# Patient Record
Sex: MALE
[De-identification: names, ages, dates, MRNs, and addresses within clinical notes are randomized per-mention and may not be internally consistent; named-entity substitution may affect disease eponyms.]

---

## 2022-06-08 ENCOUNTER — NURSE TRIAGE (OUTPATIENT)
Dept: OTHER | Facility: CLINIC | Age: 67
End: 2022-06-08

## 2023-04-17 DIAGNOSIS — I10 HYPERTENSION, UNSPECIFIED TYPE: Primary | ICD-10-CM

## 2023-04-17 RX ORDER — METOPROLOL SUCCINATE 50 MG/1
50 TABLET, EXTENDED RELEASE ORAL DAILY
COMMUNITY
End: 2023-04-17 | Stop reason: SDUPTHER

## 2023-04-17 RX ORDER — METOPROLOL SUCCINATE 50 MG/1
50 TABLET, EXTENDED RELEASE ORAL DAILY
Qty: 90 TABLET | Refills: 0 | Status: SHIPPED | OUTPATIENT
Start: 2023-04-17 | End: 2023-07-17 | Stop reason: SDUPTHER

## 2023-05-01 DIAGNOSIS — E11.9 TYPE 2 DIABETES MELLITUS WITHOUT COMPLICATION, WITHOUT LONG-TERM CURRENT USE OF INSULIN (MULTI): Primary | ICD-10-CM

## 2023-05-01 RX ORDER — GLIPIZIDE 10 MG/1
10 TABLET ORAL
COMMUNITY
Start: 2017-04-13 | End: 2023-05-01 | Stop reason: SDUPTHER

## 2023-05-01 RX ORDER — GLIPIZIDE 10 MG/1
10 TABLET ORAL
Qty: 180 TABLET | Refills: 0 | Status: SHIPPED | OUTPATIENT
Start: 2023-05-01 | End: 2023-08-08

## 2023-07-17 DIAGNOSIS — E78.5 HYPERLIPIDEMIA, UNSPECIFIED HYPERLIPIDEMIA TYPE: Primary | ICD-10-CM

## 2023-07-17 DIAGNOSIS — I10 HYPERTENSION, UNSPECIFIED TYPE: ICD-10-CM

## 2023-07-17 RX ORDER — METOPROLOL SUCCINATE 50 MG/1
50 TABLET, EXTENDED RELEASE ORAL DAILY
Qty: 30 TABLET | Refills: 0 | Status: SHIPPED | OUTPATIENT
Start: 2023-07-17 | End: 2023-08-17 | Stop reason: SDUPTHER

## 2023-07-17 RX ORDER — ATORVASTATIN CALCIUM 40 MG/1
40 TABLET, FILM COATED ORAL DAILY
COMMUNITY
Start: 2023-04-17 | End: 2023-07-17 | Stop reason: SDUPTHER

## 2023-07-17 RX ORDER — ATORVASTATIN CALCIUM 40 MG/1
40 TABLET, FILM COATED ORAL DAILY
Qty: 30 TABLET | Refills: 0 | Status: SHIPPED | OUTPATIENT
Start: 2023-07-17 | End: 2023-08-17 | Stop reason: SDUPTHER

## 2023-08-04 DIAGNOSIS — E11.9 TYPE 2 DIABETES MELLITUS WITHOUT COMPLICATION, WITHOUT LONG-TERM CURRENT USE OF INSULIN (MULTI): ICD-10-CM

## 2023-08-08 RX ORDER — GLIPIZIDE 10 MG/1
10 TABLET ORAL
Qty: 60 TABLET | Refills: 0 | Status: SHIPPED | OUTPATIENT
Start: 2023-08-08 | End: 2023-08-17 | Stop reason: SDUPTHER

## 2023-08-16 PROBLEM — I71.40 AAA (ABDOMINAL AORTIC ANEURYSM) (CMS-HCC): Status: ACTIVE | Noted: 2023-08-16

## 2023-08-16 PROBLEM — E16.2 HYPOGLYCEMIA: Status: ACTIVE | Noted: 2023-08-16

## 2023-08-16 PROBLEM — H52.03 HYPEROPIA OF BOTH EYES: Status: ACTIVE | Noted: 2023-08-16

## 2023-08-16 PROBLEM — J44.9 CHRONIC OBSTRUCTIVE PULMONARY DISEASE (MULTI): Status: ACTIVE | Noted: 2023-08-16

## 2023-08-16 PROBLEM — R09.89 BRUIT OF LEFT CAROTID ARTERY: Status: ACTIVE | Noted: 2023-08-16

## 2023-08-16 PROBLEM — I77.1: Status: ACTIVE | Noted: 2023-08-16

## 2023-08-16 PROBLEM — E11.9 DIABETES MELLITUS TYPE 2, CONTROLLED, WITHOUT COMPLICATIONS (MULTI): Status: ACTIVE | Noted: 2023-08-16

## 2023-08-16 PROBLEM — I10 BENIGN ESSENTIAL HYPERTENSION: Status: ACTIVE | Noted: 2023-08-16

## 2023-08-16 PROBLEM — F17.201 TOBACCO USE DISORDER, MODERATE, IN EARLY REMISSION: Status: ACTIVE | Noted: 2023-08-16

## 2023-08-16 PROBLEM — K21.9 GERD (GASTROESOPHAGEAL REFLUX DISEASE): Status: ACTIVE | Noted: 2023-08-16

## 2023-08-16 PROBLEM — R05.3 CHRONIC COUGH: Status: ACTIVE | Noted: 2023-08-16

## 2023-08-16 PROBLEM — F51.01 PRIMARY INSOMNIA: Status: ACTIVE | Noted: 2023-08-16

## 2023-08-16 PROBLEM — N52.9 ERECTILE DYSFUNCTION: Status: ACTIVE | Noted: 2023-08-16

## 2023-08-16 PROBLEM — I65.21 STENOSIS OF RIGHT CAROTID ARTERY: Status: ACTIVE | Noted: 2023-08-16

## 2023-08-16 PROBLEM — I73.9 PERIPHERAL VASCULAR DISEASE (CMS-HCC): Status: ACTIVE | Noted: 2023-08-16

## 2023-08-16 PROBLEM — K52.9 CHRONIC DIARRHEA: Status: ACTIVE | Noted: 2023-08-16

## 2023-08-16 PROBLEM — S16.1XXA NECK STRAIN, INITIAL ENCOUNTER: Status: RESOLVED | Noted: 2023-08-16 | Resolved: 2023-08-16

## 2023-08-16 PROBLEM — K63.8219 SMALL INTESTINAL BACTERIAL OVERGROWTH: Status: RESOLVED | Noted: 2023-08-16 | Resolved: 2023-08-16

## 2023-08-16 PROBLEM — G89.29 CHRONIC RIGHT SHOULDER PAIN: Status: ACTIVE | Noted: 2023-08-16

## 2023-08-16 PROBLEM — D72.829 LEUKOCYTOSIS: Status: RESOLVED | Noted: 2023-08-16 | Resolved: 2023-08-16

## 2023-08-16 PROBLEM — R74.8 ELEVATED LIVER ENZYMES: Status: ACTIVE | Noted: 2023-08-16

## 2023-08-16 PROBLEM — K86.89 SECONDARY PANCREATIC INSUFFICIENCY (HHS-HCC): Status: ACTIVE | Noted: 2023-08-16

## 2023-08-16 PROBLEM — R74.01 TRANSAMINITIS: Status: RESOLVED | Noted: 2023-08-16 | Resolved: 2023-08-16

## 2023-08-16 PROBLEM — N18.30 CKD (CHRONIC KIDNEY DISEASE), STAGE III (MULTI): Status: ACTIVE | Noted: 2023-08-16

## 2023-08-16 PROBLEM — H35.3121 EARLY DRY STAGE NONEXUDATIVE AGE-RELATED MACULAR DEGENERATION OF LEFT EYE: Status: ACTIVE | Noted: 2023-08-16

## 2023-08-16 PROBLEM — M25.511 CHRONIC RIGHT SHOULDER PAIN: Status: ACTIVE | Noted: 2023-08-16

## 2023-08-16 PROBLEM — H25.13 CATARACT, NUCLEAR SCLEROTIC, BOTH EYES: Status: ACTIVE | Noted: 2023-08-16

## 2023-08-16 PROBLEM — E13.9: Status: RESOLVED | Noted: 2023-08-16 | Resolved: 2023-08-16

## 2023-08-16 PROBLEM — E78.2 HYPERLIPIDEMIA, MIXED: Status: ACTIVE | Noted: 2023-08-16

## 2023-08-16 RX ORDER — HYDRALAZINE HYDROCHLORIDE 10 MG/1
10 TABLET, FILM COATED ORAL EVERY 12 HOURS
COMMUNITY
Start: 2023-05-09 | End: 2023-08-17 | Stop reason: SDUPTHER

## 2023-08-16 RX ORDER — PANCRELIPASE 24000; 76000; 120000 [USP'U]/1; [USP'U]/1; [USP'U]/1
CAPSULE, DELAYED RELEASE PELLETS ORAL
COMMUNITY
Start: 2019-10-01

## 2023-08-16 RX ORDER — PEN NEEDLE, DIABETIC 31 GX5/16"
NEEDLE, DISPOSABLE MISCELLANEOUS
COMMUNITY
Start: 2023-05-09 | End: 2023-08-17 | Stop reason: SDUPTHER

## 2023-08-16 RX ORDER — AMLODIPINE BESYLATE 10 MG/1
10 TABLET ORAL DAILY
COMMUNITY
End: 2023-08-17 | Stop reason: SDUPTHER

## 2023-08-16 RX ORDER — BLOOD SUGAR DIAGNOSTIC
STRIP MISCELLANEOUS
COMMUNITY
End: 2023-08-17 | Stop reason: SDUPTHER

## 2023-08-16 RX ORDER — INSULIN GLARGINE 100 [IU]/ML
20 INJECTION, SOLUTION SUBCUTANEOUS NIGHTLY
COMMUNITY
End: 2023-08-17 | Stop reason: SDUPTHER

## 2023-08-17 ENCOUNTER — LAB (OUTPATIENT)
Dept: LAB | Facility: LAB | Age: 68
End: 2023-08-17
Payer: MEDICARE

## 2023-08-17 ENCOUNTER — OFFICE VISIT (OUTPATIENT)
Dept: PRIMARY CARE | Facility: CLINIC | Age: 68
End: 2023-08-17
Payer: MEDICARE

## 2023-08-17 VITALS
OXYGEN SATURATION: 98 % | SYSTOLIC BLOOD PRESSURE: 136 MMHG | HEART RATE: 65 BPM | HEIGHT: 69 IN | WEIGHT: 162 LBS | DIASTOLIC BLOOD PRESSURE: 78 MMHG | BODY MASS INDEX: 23.99 KG/M2

## 2023-08-17 DIAGNOSIS — Z00.00 HEALTH CARE MAINTENANCE: Primary | ICD-10-CM

## 2023-08-17 DIAGNOSIS — I10 HYPERTENSION, UNSPECIFIED TYPE: ICD-10-CM

## 2023-08-17 DIAGNOSIS — Z91.89 AT RISK FOR IMPAIRED HEALTH MAINTENANCE: ICD-10-CM

## 2023-08-17 DIAGNOSIS — Z00.00 HEALTH CARE MAINTENANCE: ICD-10-CM

## 2023-08-17 DIAGNOSIS — I73.9 PERIPHERAL VASCULAR DISEASE (CMS-HCC): ICD-10-CM

## 2023-08-17 DIAGNOSIS — E11.9 TYPE 2 DIABETES MELLITUS WITHOUT COMPLICATION, WITHOUT LONG-TERM CURRENT USE OF INSULIN (MULTI): ICD-10-CM

## 2023-08-17 DIAGNOSIS — E78.5 HYPERLIPIDEMIA, UNSPECIFIED HYPERLIPIDEMIA TYPE: ICD-10-CM

## 2023-08-17 LAB
ALANINE AMINOTRANSFERASE (SGPT) (U/L) IN SER/PLAS: 52 U/L (ref 10–52)
ALBUMIN (G/DL) IN SER/PLAS: 3.5 G/DL (ref 3.4–5)
ALKALINE PHOSPHATASE (U/L) IN SER/PLAS: 122 U/L (ref 33–136)
ANION GAP IN SER/PLAS: 15 MMOL/L (ref 10–20)
ASPARTATE AMINOTRANSFERASE (SGOT) (U/L) IN SER/PLAS: 40 U/L (ref 9–39)
BASOPHILS (10*3/UL) IN BLOOD BY AUTOMATED COUNT: 0.09 X10E9/L (ref 0–0.1)
BASOPHILS/100 LEUKOCYTES IN BLOOD BY AUTOMATED COUNT: 0.8 % (ref 0–2)
BILIRUBIN TOTAL (MG/DL) IN SER/PLAS: 0.3 MG/DL (ref 0–1.2)
CALCIUM (MG/DL) IN SER/PLAS: 8.4 MG/DL (ref 8.6–10.6)
CARBON DIOXIDE, TOTAL (MMOL/L) IN SER/PLAS: 18 MMOL/L (ref 21–32)
CHLORIDE (MMOL/L) IN SER/PLAS: 113 MMOL/L (ref 98–107)
CHOLESTEROL (MG/DL) IN SER/PLAS: 106 MG/DL (ref 0–199)
CHOLESTEROL IN HDL (MG/DL) IN SER/PLAS: 29.6 MG/DL
CHOLESTEROL/HDL RATIO: 3.6
CREATININE (MG/DL) IN SER/PLAS: 2.33 MG/DL (ref 0.5–1.3)
EOSINOPHILS (10*3/UL) IN BLOOD BY AUTOMATED COUNT: 0.21 X10E9/L (ref 0–0.7)
EOSINOPHILS/100 LEUKOCYTES IN BLOOD BY AUTOMATED COUNT: 1.9 % (ref 0–6)
ERYTHROCYTE DISTRIBUTION WIDTH (RATIO) BY AUTOMATED COUNT: 13.7 % (ref 11.5–14.5)
ERYTHROCYTE MEAN CORPUSCULAR HEMOGLOBIN CONCENTRATION (G/DL) BY AUTOMATED: 31.3 G/DL (ref 32–36)
ERYTHROCYTE MEAN CORPUSCULAR VOLUME (FL) BY AUTOMATED COUNT: 88 FL (ref 80–100)
ERYTHROCYTES (10*6/UL) IN BLOOD BY AUTOMATED COUNT: 4.6 X10E12/L (ref 4.5–5.9)
ESTIMATED AVERAGE GLUCOSE FOR HBA1C: 200 MG/DL
GFR MALE: 30 ML/MIN/1.73M2
GLUCOSE (MG/DL) IN SER/PLAS: 48 MG/DL (ref 74–99)
HEMATOCRIT (%) IN BLOOD BY AUTOMATED COUNT: 40.6 % (ref 41–52)
HEMOGLOBIN (G/DL) IN BLOOD: 12.7 G/DL (ref 13.5–17.5)
HEMOGLOBIN A1C/HEMOGLOBIN TOTAL IN BLOOD: 8.6 %
IMMATURE GRANULOCYTES/100 LEUKOCYTES IN BLOOD BY AUTOMATED COUNT: 0.3 % (ref 0–0.9)
LDL: 62 MG/DL (ref 0–99)
LEUKOCYTES (10*3/UL) IN BLOOD BY AUTOMATED COUNT: 11.2 X10E9/L (ref 4.4–11.3)
LYMPHOCYTES (10*3/UL) IN BLOOD BY AUTOMATED COUNT: 2.81 X10E9/L (ref 1.2–4.8)
LYMPHOCYTES/100 LEUKOCYTES IN BLOOD BY AUTOMATED COUNT: 25.2 % (ref 13–44)
MONOCYTES (10*3/UL) IN BLOOD BY AUTOMATED COUNT: 0.95 X10E9/L (ref 0.1–1)
MONOCYTES/100 LEUKOCYTES IN BLOOD BY AUTOMATED COUNT: 8.5 % (ref 2–10)
NEUTROPHILS (10*3/UL) IN BLOOD BY AUTOMATED COUNT: 7.08 X10E9/L (ref 1.2–7.7)
NEUTROPHILS/100 LEUKOCYTES IN BLOOD BY AUTOMATED COUNT: 63.3 % (ref 40–80)
NRBC (PER 100 WBCS) BY AUTOMATED COUNT: 0 /100 WBC (ref 0–0)
PLATELETS (10*3/UL) IN BLOOD AUTOMATED COUNT: 251 X10E9/L (ref 150–450)
POTASSIUM (MMOL/L) IN SER/PLAS: 4.8 MMOL/L (ref 3.5–5.3)
PROSTATE SPECIFIC ANTIGEN,SCREEN: 0.47 NG/ML (ref 0–4)
PROTEIN TOTAL: 6.3 G/DL (ref 6.4–8.2)
SODIUM (MMOL/L) IN SER/PLAS: 141 MMOL/L (ref 136–145)
THYROTROPIN (MIU/L) IN SER/PLAS BY DETECTION LIMIT <= 0.05 MIU/L: 1.13 MIU/L (ref 0.44–3.98)
THYROXINE (T4) FREE (NG/DL) IN SER/PLAS: 0.95 NG/DL (ref 0.78–1.48)
TRIGLYCERIDE (MG/DL) IN SER/PLAS: 71 MG/DL (ref 0–149)
UREA NITROGEN (MG/DL) IN SER/PLAS: 32 MG/DL (ref 6–23)
VLDL: 14 MG/DL (ref 0–40)

## 2023-08-17 PROCEDURE — 3078F DIAST BP <80 MM HG: CPT | Performed by: INTERNAL MEDICINE

## 2023-08-17 PROCEDURE — 3046F HEMOGLOBIN A1C LEVEL >9.0%: CPT | Performed by: INTERNAL MEDICINE

## 2023-08-17 PROCEDURE — 85025 COMPLETE CBC W/AUTO DIFF WBC: CPT

## 2023-08-17 PROCEDURE — 1126F AMNT PAIN NOTED NONE PRSNT: CPT | Performed by: INTERNAL MEDICINE

## 2023-08-17 PROCEDURE — 83036 HEMOGLOBIN GLYCOSYLATED A1C: CPT

## 2023-08-17 PROCEDURE — 84153 ASSAY OF PSA TOTAL: CPT

## 2023-08-17 PROCEDURE — 80061 LIPID PANEL: CPT

## 2023-08-17 PROCEDURE — 99214 OFFICE O/P EST MOD 30 MIN: CPT | Performed by: INTERNAL MEDICINE

## 2023-08-17 PROCEDURE — 36415 COLL VENOUS BLD VENIPUNCTURE: CPT

## 2023-08-17 PROCEDURE — 1159F MED LIST DOCD IN RCRD: CPT | Performed by: INTERNAL MEDICINE

## 2023-08-17 PROCEDURE — 84439 ASSAY OF FREE THYROXINE: CPT

## 2023-08-17 PROCEDURE — 84443 ASSAY THYROID STIM HORMONE: CPT

## 2023-08-17 PROCEDURE — 3075F SYST BP GE 130 - 139MM HG: CPT | Performed by: INTERNAL MEDICINE

## 2023-08-17 PROCEDURE — 80053 COMPREHEN METABOLIC PANEL: CPT

## 2023-08-17 RX ORDER — CLOPIDOGREL BISULFATE 75 MG/1
75 TABLET ORAL DAILY
Qty: 90 TABLET | Refills: 1 | Status: SHIPPED | OUTPATIENT
Start: 2023-08-17

## 2023-08-17 RX ORDER — PEN NEEDLE, DIABETIC 30 GX3/16"
NEEDLE, DISPOSABLE MISCELLANEOUS
Qty: 100 EACH | Refills: 1 | Status: SHIPPED | OUTPATIENT
Start: 2023-08-17

## 2023-08-17 RX ORDER — AMLODIPINE BESYLATE 10 MG/1
10 TABLET ORAL DAILY
Qty: 90 TABLET | Refills: 1 | Status: SHIPPED | OUTPATIENT
Start: 2023-08-17 | End: 2024-04-15 | Stop reason: SDUPTHER

## 2023-08-17 RX ORDER — HYDRALAZINE HYDROCHLORIDE 10 MG/1
10 TABLET, FILM COATED ORAL EVERY 12 HOURS
Qty: 180 TABLET | Refills: 1 | Status: SHIPPED | OUTPATIENT
Start: 2023-08-17 | End: 2024-03-04

## 2023-08-17 RX ORDER — METOPROLOL SUCCINATE 50 MG/1
50 TABLET, EXTENDED RELEASE ORAL DAILY
Qty: 90 TABLET | Refills: 1 | Status: SHIPPED | OUTPATIENT
Start: 2023-08-17 | End: 2024-02-21

## 2023-08-17 RX ORDER — TALC
POWDER (GRAM) TOPICAL
COMMUNITY
Start: 2020-02-12 | End: 2024-02-21 | Stop reason: WASHOUT

## 2023-08-17 RX ORDER — BLOOD SUGAR DIAGNOSTIC
STRIP MISCELLANEOUS
Qty: 100 STRIP | Refills: 1 | Status: SHIPPED | OUTPATIENT
Start: 2023-08-17

## 2023-08-17 RX ORDER — ATORVASTATIN CALCIUM 40 MG/1
40 TABLET, FILM COATED ORAL DAILY
Qty: 90 TABLET | Refills: 1 | Status: SHIPPED | OUTPATIENT
Start: 2023-08-17 | End: 2024-02-16 | Stop reason: SDUPTHER

## 2023-08-17 RX ORDER — GLIPIZIDE 10 MG/1
10 TABLET ORAL
Qty: 180 TABLET | Refills: 1 | Status: SHIPPED | OUTPATIENT
Start: 2023-08-17 | End: 2023-08-17 | Stop reason: ALTCHOICE

## 2023-08-17 RX ORDER — CLOPIDOGREL BISULFATE 75 MG/1
75 TABLET ORAL DAILY
COMMUNITY
End: 2023-08-17 | Stop reason: SDUPTHER

## 2023-08-17 RX ORDER — INSULIN GLARGINE 100 [IU]/ML
20 INJECTION, SOLUTION SUBCUTANEOUS NIGHTLY
Qty: 15 ML | Refills: 1 | Status: SHIPPED | OUTPATIENT
Start: 2023-08-17 | End: 2024-02-16 | Stop reason: SDUPTHER

## 2023-08-17 ASSESSMENT — ENCOUNTER SYMPTOMS
LOSS OF SENSATION IN FEET: 0
DEPRESSION: 0
OCCASIONAL FEELINGS OF UNSTEADINESS: 0

## 2023-08-17 NOTE — PROGRESS NOTES
"Subjective   Patient ID: Flip Murphy is a 67 y.o. male who presents for Follow-up and Med Refill.  HPI        Patient presents for follow-up he is overall doing well aside from low blood sugars 50s to 60s sometimes he gets little shaky when this happens overall trend is around 1/21/1930 he has not followed up with endocrinology as ordered yet        Health Maintenance:      Colonoscopy:      Mammogram:      Pelvic/Pap:      Low dose chest CT:      Aorta duplex:      Optho:      Podiatry:        Vaccines:      Prevnar 20:      Prevnar 13:      Pneumovax 23:      Tdap:      Shingrix:      COVID:      Influenza:        ROS:      General: denies fever/chills/weight loss      Head: denies HA/trauma/masses/dizziness      Eyes: denies vision change/loss of vision/blurry vision/diplopia/eye pain      Ears: denies hearing loss/tinnitus/otalgia/otorrhea      Nose: denies nasal drainage/anosmia      Throat: denies dysphagia/odynophagia      Lymphatics: denies lymph node swelling      Cardiac: denies CP/palpitations/orthopnea/PND      Pulmonary: denies dyspnea/cough/wheezing      GI: denies abd pain/n/v/diarrhea/melena/hematochezia/hematemesis      : denies dysuria/hematuria/change frequency      Genital: denies genital discharge/lesions      Skin: denies rashes/lesions/masses      MSK: denies weakness/swelling/edema/gait imbalance/pain      Neuro: Intermittent tremor when his sugar is low none at present denies paresthesias/seizures/dysarthria      Psych: denies depression/anxiety/suicidal or homicidal ideations            Objective   /78   Pulse 65   Ht 1.753 m (5' 9\")   Wt 73.5 kg (162 lb)   SpO2 98%   BMI 23.92 kg/m²      Physical Exam:     General: AO3, NAD     Head: atraumatic/NC     Eyes: EOMI/PERRLA. Negative APD     Ears: TM pearly gray, EAC clear. No lesions or erythema     Nose: symmetric nares, no discharge     Throat: trachea midline, uvula midline pink mucosa. No thyromegaly     Lymphatics: no " "cervical/supraclavicular/ant or posterior cervical adenopathy/axillary/inguinal adenopathy     Breast: not examined     Chest: no deformity or tenderness to palpation     Pulm: CTA b/l, no wheeze/rhonchi/rales. nonlabored     Cardiac: RRR +s1s2, no m/r/g.      GI: soft, NT/ND. Normoactive Bsx4. No rebound/guarding.     Rectal: no examined     MSK: 5/5 strength UE LE. No edema/clubbing/cyanosis     Skin: no rashes/lesions     Vascular: 2+ palp DP PT radials b/l. Negative carotid bruit     Neuro: CNII-XII intact. No focal deficits. Reflexes 2/4 brachioradialis bicep tricep patellar achilles. Finger to nose intact.     Psych: appropriate mood/affect                    No results found for: \"BMPR1A\", \"CBCDIF\"      Assessment/Plan   Diagnoses and all orders for this visit:  Health care maintenance  -     CBC and Auto Differential; Future  -     Comprehensive Metabolic Panel; Future  -     Hemoglobin A1C; Future  -     Lipid panel; Future  -     T4, free; Future  -     TSH; Future  -     Prostate Spec.Ag,Screen; Future  Hyperlipidemia, unspecified hyperlipidemia type  -     atorvastatin (Lipitor) 40 mg tablet; Take 1 tablet (40 mg) by mouth once daily.  Hypertension, unspecified type  -     metoprolol succinate XL (Toprol-XL) 50 mg 24 hr tablet; Take 1 tablet (50 mg) by mouth once daily.  -     hydrALAZINE (Apresoline) 10 mg tablet; Take 1 tablet (10 mg) by mouth every 12 hours.  -     amLODIPine (Norvasc) 10 mg tablet; Take 1 tablet (10 mg) by mouth once daily.  Type 2 diabetes mellitus without complication, without long-term current use of insulin (CMS/[facility])  -     insulin glargine (Lantus Solostar U-100 Insulin) 100 unit/mL (3 mL) pen; Inject 20 Units under the skin once daily at bedtime.  -     pen needle, diabetic (BD Ultra-Fine Mini Pen Needle) 31 gauge x 3/16\" needle; USE DAILY FOR INSULIN INJECTIONS  -     blood sugar diagnostic (Contour Next Test Strips) strip; test once daily  -     SITagliptin phosphate " (Januvia) 50 mg tablet; Take 1 tablet (50 mg) by mouth once daily.  Peripheral vascular disease (CMS/HCC)  -     clopidogrel (Plavix) 75 mg tablet; Take 1 tablet (75 mg) by mouth once daily.  At risk for impaired health maintenance  -     CBC and Auto Differential; Future    Call follow-up with endocrinology as ordered    Call and follow-up with cardiology as ordered    Call and complete colonoscopy as he stated next month he had a planned    Thank you for making appointment today Flip    Please follow-up in 6 months       Esperanza Posey MA

## 2023-08-25 ENCOUNTER — TELEPHONE (OUTPATIENT)
Dept: PRIMARY CARE | Facility: CLINIC | Age: 68
End: 2023-08-25
Payer: MEDICARE

## 2023-08-25 DIAGNOSIS — D64.9 ANEMIA, UNSPECIFIED TYPE: ICD-10-CM

## 2023-08-25 DIAGNOSIS — R74.01 ELEVATED AST (SGOT): ICD-10-CM

## 2023-08-25 DIAGNOSIS — N18.30 TYPE 2 DIABETES MELLITUS WITH STAGE 3 CHRONIC KIDNEY DISEASE, UNSPECIFIED WHETHER LONG TERM INSULIN USE, UNSPECIFIED WHETHER STAGE 3A OR 3B CKD (MULTI): Primary | ICD-10-CM

## 2023-08-25 DIAGNOSIS — E11.22 TYPE 2 DIABETES MELLITUS WITH STAGE 3 CHRONIC KIDNEY DISEASE, UNSPECIFIED WHETHER LONG TERM INSULIN USE, UNSPECIFIED WHETHER STAGE 3A OR 3B CKD (MULTI): Primary | ICD-10-CM

## 2023-08-25 NOTE — TELEPHONE ENCOUNTER
Result Communication    Resulted Orders   CBC and Auto Differential   Result Value Ref Range    WBC 11.2 4.4 - 11.3 x10E9/L    nRBC 0.0 0.0 - 0.0 /100 WBC    RBC 4.60 4.50 - 5.90 x10E12/L    Hemoglobin 12.7 (L) 13.5 - 17.5 g/dL    Hematocrit 40.6 (L) 41.0 - 52.0 %    MCV 88 80 - 100 fL    MCHC 31.3 (L) 32.0 - 36.0 g/dL    Platelets 251 150 - 450 x10E9/L    RDW 13.7 11.5 - 14.5 %    Neutrophils % 63.3 40.0 - 80.0 %    Immature Granulocytes %, Automated 0.3 0.0 - 0.9 %      Comment:       Immature Granulocyte Count (IG) includes promyelocytes,    myelocytes and metamyelocytes but does not include bands.   Percent differential counts (%) should be interpreted in the   context of the absolute cell counts (cells/L).    Lymphocytes % 25.2 13.0 - 44.0 %    Monocytes % 8.5 2.0 - 10.0 %    Eosinophils % 1.9 0.0 - 6.0 %    Basophils % 0.8 0.0 - 2.0 %    Neutrophils Absolute 7.08 1.20 - 7.70 x10E9/L    Lymphocytes Absolute 2.81 1.20 - 4.80 x10E9/L    Monocytes Absolute 0.95 0.10 - 1.00 x10E9/L    Eosinophils Absolute 0.21 0.00 - 0.70 x10E9/L    Basophils Absolute 0.09 0.00 - 0.10 x10E9/L   Comprehensive Metabolic Panel   Result Value Ref Range    Glucose 48 (L) 74 - 99 mg/dL    Sodium 141 136 - 145 mmol/L    Potassium 4.8 3.5 - 5.3 mmol/L    Chloride 113 (H) 98 - 107 mmol/L    Bicarbonate 18 (L) 21 - 32 mmol/L    Anion Gap 15 10 - 20 mmol/L    Urea Nitrogen 32 (H) 6 - 23 mg/dL    Creatinine 2.33 (H) 0.50 - 1.30 mg/dL    GFR MALE 30 (A) >90 mL/min/1.73m2      Comment:       CALCULATIONS OF ESTIMATED GFR ARE PERFORMED   USING THE 2021 CKD-EPI STUDY REFIT EQUATION   WITHOUT THE RACE VARIABLE FOR THE IDMS-TRACEABLE   CREATININE METHODS.    https://jasn.asnjournals.org/content/early/2021/09/22/ASN.2368859437    Calcium 8.4 (L) 8.6 - 10.6 mg/dL    Albumin 3.5 3.4 - 5.0 g/dL    Alkaline Phosphatase 122 33 - 136 U/L    Total Protein 6.3 (L) 6.4 - 8.2 g/dL    AST 40 (H) 9 - 39 U/L    Total Bilirubin 0.3 0.0 - 1.2 mg/dL    ALT (SGPT) 52  10 - 52 U/L      Comment:       Patients treated with Sulfasalazine may generate    falsely decreased results for ALT.   Hemoglobin A1C   Result Value Ref Range    Hemoglobin A1C 8.6 (A) %      Comment:           Diagnosis of Diabetes-Adults   Non-Diabetic: < or = 5.6%   Increased risk for developing diabetes: 5.7-6.4%   Diagnostic of diabetes: > or = 6.5%  .       Monitoring of Diabetes                Age (y)     Therapeutic Goal (%)   Adults:          >18           <7.0   Pediatrics:    13-18           <7.5                   7-12           <8.0                   0- 6            7.5-8.5   American Diabetes Association. Diabetes Care 33(S1), Jan 2010.    Estimated Average Glucose 200 MG/DL   Lipid panel   Result Value Ref Range    Cholesterol 106 0 - 199 mg/dL      Comment:      .      AGE      DESIRABLE   BORDERLINE HIGH   HIGH     0-19 Y     0 - 169       170 - 199     >/= 200    20-24 Y     0 - 189       190 - 224     >/= 225         >24 Y     0 - 199       200 - 239     >/= 240   **All ranges are based on fasting samples. Specific   therapeutic targets will vary based on patient-specific   cardiac risk.  .   Pediatric guidelines reference:Pediatrics 2011, 128(S5).   Adult guidelines reference: NCEP ATPIII Guidelines,     JANIS 2001, 258:2486-97  .   Venipuncture immediately after or during the    administration of Metamizole may lead to falsely   low results. Testing should be performed immediately   prior to Metamizole dosing.    HDL 29.6 (A) mg/dL      Comment:      .      AGE      VERY LOW   LOW     NORMAL    HIGH       0-19 Y       < 35   < 40     40-45     ----    20-24 Y       ----   < 40       >45     ----      >24 Y       ----   < 40     40-60      >60  .    Cholesterol/HDL Ratio 3.6       Comment:      REF VALUES  DESIRABLE  < 3.4  HIGH RISK  > 5.0    LDL 62 0 - 99 mg/dL      Comment:      .                           NEAR      BORD      AGE      DESIRABLE  OPTIMAL    HIGH     HIGH     VERY HIGH     0-19  Y     0 - 109     ---    110-129   >/= 130     ----    20-24 Y     0 - 119     ---    120-159   >/= 160     ----      >24 Y     0 -  99   100-129  130-159   160-189     >/=190  .    VLDL 14 0 - 40 mg/dL    Triglycerides 71 0 - 149 mg/dL      Comment:      .      AGE      DESIRABLE   BORDERLINE HIGH   HIGH     VERY HIGH   0 D-90 D    19 - 174         ----         ----        ----  91 D- 9 Y     0 -  74        75 -  99     >/= 100      ----    10-19 Y     0 -  89        90 - 129     >/= 130      ----    20-24 Y     0 - 114       115 - 149     >/= 150      ----         >24 Y     0 - 149       150 - 199    200- 499    >/= 500  .   Venipuncture immediately after or during the    administration of Metamizole may lead to falsely   low results. Testing should be performed immediately   prior to Metamizole dosing.   T4, free   Result Value Ref Range    Free T4 0.95 0.78 - 1.48 ng/dL      Comment:       Thyroxine Free testing is performed using different testing    methodology at [facility] than at other    [facility]. Direct result comparisons should    only be made within the same method.   TSH   Result Value Ref Range    TSH 1.13 0.44 - 3.98 mIU/L      Comment:       TSH testing is performed using different testing    methodology at [facility] than at other    [facility]. Direct result comparisons should    only be made within the same method.   Prostate Spec.Ag,Screen   Result Value Ref Range    Prostate Specific Antigen,Screen 0.47 0.00 - 4.00 ng/mL      Comment:      The FDA requires that the method used for PSA assay be   reported to the physician. Values obtained with different   assay methods must not be used interchangeably. This test   was performed at [facility] using the Siemens  [employer] PSA method, which is a sandwich immunoassay using   chemiluminescence for quantitation. The assay is approved  for measurement of prostate-specific antigen (PSA) in    serum and may be used in conjunction with a digital rectal  examination in men 50 years and older as an aid in   detection of prostate cancer.   5-Alpha-reductase inhibitors (e.g. Proscar, Finasteride,   Avodart, Dutasteride and Mica) for the treatment of BPH   have been shown to lower PSA levels by an average of 50%   after 6 months of treatment.       2:53 PM      Called patient advised uncontrolled diabetes 8.6 A1c improving from 9.7 should follow-up with endocrinology for further management  He has worsening CKD stage III likely related to uncontrolled diabetes versus other refuses ER , ALON on CKD, otherwise feels fine no symptoms however should follow-up with nephrology increase water intake 12 ounces 3 times a day avoid nephrotoxins such as NSAIDs  Mild elevated AST unclear cause check liver ultrasound  Follow-up with hematology likely anemia of chronic disease versus other  Otherwise currently available lab work is stable normal if any further questions or would like in office result review please schedule follow-up in the next 2 to 4 weeks thank you

## 2023-09-05 ENCOUNTER — HOSPITAL ENCOUNTER (OUTPATIENT)
Dept: DATA CONVERSION | Facility: HOSPITAL | Age: 68
End: 2023-09-05
Attending: INTERNAL MEDICINE | Admitting: INTERNAL MEDICINE
Payer: MEDICARE

## 2023-09-05 DIAGNOSIS — D12.5 BENIGN NEOPLASM OF SIGMOID COLON: ICD-10-CM

## 2023-09-05 DIAGNOSIS — K64.8 OTHER HEMORRHOIDS: ICD-10-CM

## 2023-09-05 DIAGNOSIS — D12.2 BENIGN NEOPLASM OF ASCENDING COLON: ICD-10-CM

## 2023-09-05 DIAGNOSIS — Z86.010 PERSONAL HISTORY OF COLONIC POLYPS: ICD-10-CM

## 2023-09-05 DIAGNOSIS — D12.4 BENIGN NEOPLASM OF DESCENDING COLON: ICD-10-CM

## 2023-09-05 DIAGNOSIS — Z12.11 ENCOUNTER FOR SCREENING FOR MALIGNANT NEOPLASM OF COLON: ICD-10-CM

## 2023-09-05 DIAGNOSIS — D12.0 BENIGN NEOPLASM OF CECUM: ICD-10-CM

## 2023-09-05 DIAGNOSIS — Z79.899 OTHER LONG TERM (CURRENT) DRUG THERAPY: ICD-10-CM

## 2023-09-05 DIAGNOSIS — D12.3 BENIGN NEOPLASM OF TRANSVERSE COLON: ICD-10-CM

## 2023-09-05 LAB — POCT GLUCOSE: 100 MG/DL (ref 74–99)

## 2023-09-11 LAB
COMPLETE PATHOLOGY REPORT: NORMAL
CONVERTED CLINICAL DIAGNOSIS-HISTORY: NORMAL
CONVERTED FINAL DIAGNOSIS: NORMAL
CONVERTED FINAL REPORT PDF LINK TO COPY AND PASTE: NORMAL
CONVERTED GROSS DESCRIPTION: NORMAL

## 2023-10-24 ENCOUNTER — PHARMACY VISIT (OUTPATIENT)
Dept: PHARMACY | Facility: CLINIC | Age: 68
End: 2023-10-24
Payer: MEDICARE

## 2023-10-24 ENCOUNTER — SPECIALTY PHARMACY (OUTPATIENT)
Dept: PHARMACY | Facility: CLINIC | Age: 68
End: 2023-10-24

## 2023-11-30 ENCOUNTER — PHARMACY VISIT (OUTPATIENT)
Dept: PHARMACY | Facility: CLINIC | Age: 68
End: 2023-11-30
Payer: MEDICARE

## 2023-11-30 ENCOUNTER — SPECIALTY PHARMACY (OUTPATIENT)
Dept: PHARMACY | Facility: CLINIC | Age: 68
End: 2023-11-30

## 2023-11-30 PROCEDURE — RXMED WILLOW AMBULATORY MEDICATION CHARGE

## 2024-01-22 ENCOUNTER — APPOINTMENT (OUTPATIENT)
Dept: HEMATOLOGY/ONCOLOGY | Facility: CLINIC | Age: 69
End: 2024-01-22
Payer: MEDICARE

## 2024-02-07 PROBLEM — J43.9 EMPHYSEMA, UNSPECIFIED (MULTI): Status: ACTIVE | Noted: 2024-02-07

## 2024-02-07 PROBLEM — I27.20 PULMONARY HTN (MULTI): Status: ACTIVE | Noted: 2017-08-11

## 2024-02-07 PROBLEM — E83.42 HYPOMAGNESEMIA: Status: ACTIVE | Noted: 2022-11-26

## 2024-02-16 DIAGNOSIS — E78.5 HYPERLIPIDEMIA, UNSPECIFIED HYPERLIPIDEMIA TYPE: ICD-10-CM

## 2024-02-16 DIAGNOSIS — E11.9 TYPE 2 DIABETES MELLITUS WITHOUT COMPLICATION, WITHOUT LONG-TERM CURRENT USE OF INSULIN (MULTI): ICD-10-CM

## 2024-02-16 RX ORDER — ATORVASTATIN CALCIUM 40 MG/1
40 TABLET, FILM COATED ORAL DAILY
Qty: 90 TABLET | Refills: 1 | Status: SHIPPED | OUTPATIENT
Start: 2024-02-16

## 2024-02-16 RX ORDER — INSULIN GLARGINE 100 [IU]/ML
20 INJECTION, SOLUTION SUBCUTANEOUS NIGHTLY
Qty: 15 ML | Refills: 1 | Status: SHIPPED | OUTPATIENT
Start: 2024-02-16

## 2024-02-18 DIAGNOSIS — I10 HYPERTENSION, UNSPECIFIED TYPE: ICD-10-CM

## 2024-02-20 PROBLEM — K86.1 ACUTE ON CHRONIC PANCREATITIS (MULTI): Status: ACTIVE | Noted: 2022-11-26

## 2024-02-20 PROBLEM — K85.90 ACUTE ON CHRONIC PANCREATITIS (MULTI): Status: ACTIVE | Noted: 2022-11-26

## 2024-02-20 PROBLEM — N18.9 CKD (CHRONIC KIDNEY DISEASE): Status: ACTIVE | Noted: 2022-11-26

## 2024-02-20 PROBLEM — I16.0 HYPERTENSIVE URGENCY: Status: RESOLVED | Noted: 2022-11-26 | Resolved: 2024-02-20

## 2024-02-20 PROBLEM — I65.23 CAROTID STENOSIS, BILATERAL: Status: ACTIVE | Noted: 2023-08-16

## 2024-02-21 ENCOUNTER — OFFICE VISIT (OUTPATIENT)
Dept: PRIMARY CARE | Facility: CLINIC | Age: 69
End: 2024-02-21
Payer: MEDICARE

## 2024-02-21 VITALS
WEIGHT: 156 LBS | OXYGEN SATURATION: 98 % | HEIGHT: 69 IN | SYSTOLIC BLOOD PRESSURE: 136 MMHG | DIASTOLIC BLOOD PRESSURE: 82 MMHG | HEART RATE: 78 BPM | BODY MASS INDEX: 23.11 KG/M2

## 2024-02-21 DIAGNOSIS — E11.9 TYPE 2 DIABETES MELLITUS WITHOUT COMPLICATION, WITH LONG-TERM CURRENT USE OF INSULIN (MULTI): Primary | ICD-10-CM

## 2024-02-21 DIAGNOSIS — E11.59 TYPE 2 DIABETES MELLITUS WITH OTHER CIRCULATORY COMPLICATION, WITH LONG-TERM CURRENT USE OF INSULIN (MULTI): ICD-10-CM

## 2024-02-21 DIAGNOSIS — Z12.11 COLON CANCER SCREENING: ICD-10-CM

## 2024-02-21 DIAGNOSIS — K86.1 ACUTE ON CHRONIC PANCREATITIS (MULTI): ICD-10-CM

## 2024-02-21 DIAGNOSIS — I74.09 AORTOILIAC OCCLUSIVE DISEASE (MULTI): ICD-10-CM

## 2024-02-21 DIAGNOSIS — J43.9 PULMONARY EMPHYSEMA, UNSPECIFIED EMPHYSEMA TYPE (MULTI): ICD-10-CM

## 2024-02-21 DIAGNOSIS — K85.90 ACUTE ON CHRONIC PANCREATITIS (MULTI): ICD-10-CM

## 2024-02-21 DIAGNOSIS — Z79.4 TYPE 2 DIABETES MELLITUS WITHOUT COMPLICATION, WITH LONG-TERM CURRENT USE OF INSULIN (MULTI): Primary | ICD-10-CM

## 2024-02-21 DIAGNOSIS — L85.3 DRY SKIN DERMATITIS: ICD-10-CM

## 2024-02-21 DIAGNOSIS — N52.9 ERECTILE DYSFUNCTION, UNSPECIFIED ERECTILE DYSFUNCTION TYPE: ICD-10-CM

## 2024-02-21 DIAGNOSIS — I27.20 PULMONARY HTN (MULTI): ICD-10-CM

## 2024-02-21 DIAGNOSIS — Z79.4 TYPE 2 DIABETES MELLITUS WITH OTHER CIRCULATORY COMPLICATION, WITH LONG-TERM CURRENT USE OF INSULIN (MULTI): ICD-10-CM

## 2024-02-21 PROCEDURE — 99215 OFFICE O/P EST HI 40 MIN: CPT | Performed by: INTERNAL MEDICINE

## 2024-02-21 PROCEDURE — G0439 PPPS, SUBSEQ VISIT: HCPCS | Performed by: INTERNAL MEDICINE

## 2024-02-21 PROCEDURE — 1126F AMNT PAIN NOTED NONE PRSNT: CPT | Performed by: INTERNAL MEDICINE

## 2024-02-21 PROCEDURE — 1036F TOBACCO NON-USER: CPT | Performed by: INTERNAL MEDICINE

## 2024-02-21 PROCEDURE — 3079F DIAST BP 80-89 MM HG: CPT | Performed by: INTERNAL MEDICINE

## 2024-02-21 PROCEDURE — 1170F FXNL STATUS ASSESSED: CPT | Performed by: INTERNAL MEDICINE

## 2024-02-21 PROCEDURE — 1159F MED LIST DOCD IN RCRD: CPT | Performed by: INTERNAL MEDICINE

## 2024-02-21 PROCEDURE — 3075F SYST BP GE 130 - 139MM HG: CPT | Performed by: INTERNAL MEDICINE

## 2024-02-21 RX ORDER — METOPROLOL SUCCINATE 50 MG/1
50 TABLET, EXTENDED RELEASE ORAL DAILY
Qty: 90 TABLET | Refills: 1 | Status: SHIPPED | OUTPATIENT
Start: 2024-02-21 | End: 2024-02-28 | Stop reason: SDUPTHER

## 2024-02-21 RX ORDER — TADALAFIL 10 MG/1
10 TABLET ORAL DAILY PRN
Qty: 12 TABLET | Refills: 2 | Status: SHIPPED | OUTPATIENT
Start: 2024-02-21 | End: 2024-03-22

## 2024-02-21 RX ORDER — AMMONIUM LACTATE 12 G/100G
CREAM TOPICAL AS NEEDED
Qty: 60 G | Refills: 2 | Status: SHIPPED | OUTPATIENT
Start: 2024-02-21 | End: 2025-02-20

## 2024-02-21 ASSESSMENT — ACTIVITIES OF DAILY LIVING (ADL)
MANAGING_FINANCES: INDEPENDENT
BATHING: INDEPENDENT
GROCERY_SHOPPING: INDEPENDENT
DOING_HOUSEWORK: INDEPENDENT
DRESSING: INDEPENDENT
TAKING_MEDICATION: INDEPENDENT

## 2024-02-21 ASSESSMENT — ENCOUNTER SYMPTOMS
DEPRESSION: 0
LOSS OF SENSATION IN FEET: 0
OCCASIONAL FEELINGS OF UNSTEADINESS: 0

## 2024-02-21 ASSESSMENT — PATIENT HEALTH QUESTIONNAIRE - PHQ9
2. FEELING DOWN, DEPRESSED OR HOPELESS: NOT AT ALL
SUM OF ALL RESPONSES TO PHQ9 QUESTIONS 1 AND 2: 0
1. LITTLE INTEREST OR PLEASURE IN DOING THINGS: NOT AT ALL

## 2024-02-21 NOTE — PROGRESS NOTES
Chief Complaint: Medicare Wellness Exam/Comprehensive Problem Focused Follow Up    HPI:   with a medical history of DM2, HTN, HLD, IBS reported pancreatic insufficiency, and AAA presenting to the office for an annual check up.   Reported ER presentation [place] November 2022 pancreatitis severe hyperglycemia    Patient describes erectile dysfunction over the years decreased strength of the erection intermittent grade 6 out of 10 tried Viagra did not help nothing makes worse  He states is not like it used to be    Home glucose reportedly 1 20-1 80    Active Problem List      Comprehensive Medical/Surgical/Social/Family History  Past Medical History:   Diagnosis Date    Abdominal distension (gaseous) 07/16/2019    Bloating    Change in bowel habit 07/16/2019    Change in bowel habits    Elevated plasma metanephrines 05/19/2016    Encounter for other preprocedural examination     Pre-op testing    Generalized abdominal pain 07/05/2019    Generalized abdominal pain    Hypertensive urgency 11/26/2022    Irritable bowel syndrome with constipation 07/02/2019    Irritable bowel syndrome with constipation    Leukocytosis 08/16/2023    Other fecal abnormalities 06/27/2019    Change in stool    Other specified diseases of intestine 09/05/2019    Small intestinal bacterial overgrowth    Other specified postprocedural states     H/O colonoscopy    Personal history of other diseases of the digestive system 05/31/2019    History of chronic constipation    S/P aortobifemoral bypass surgery 03/03/2016    Secondary diabetes (CMS/HCC) 08/16/2023    Small intestinal bacterial overgrowth 08/16/2023    Tobacco abuse counseling 02/27/2019    Tobacco abuse counseling    Tobacco use 05/06/2020    Continuous tobacco abuse     Past Surgical History:   Procedure Laterality Date    CT ANGIO NECK  3/24/2017    CT NECK ANGIO W AND WO IV CONTRAST 3/24/2017 [facility]    OTHER SURGICAL HISTORY  07/05/2019    Abdominal Aortic Aneurysm  "Repair For Dilation Or Occlusion     Social History     Social History Narrative    Not on file         Allergies and Medications  Patient has no known allergies.  Current Outpatient Medications on File Prior to Visit   Medication Sig Dispense Refill    amLODIPine (Norvasc) 10 mg tablet Take 1 tablet (10 mg) by mouth once daily. 90 tablet 1    atorvastatin (Lipitor) 40 mg tablet Take 1 tablet (40 mg) by mouth once daily. 90 tablet 1    blood sugar diagnostic (Contour Next Test Strips) strip test once daily 100 strip 1    clopidogrel (Plavix) 75 mg tablet Take 1 tablet (75 mg) by mouth once daily. 90 tablet 1    Creon 24,000-76,000 -120,000 unit capsule Take by mouth.      hydrALAZINE (Apresoline) 10 mg tablet Take 1 tablet (10 mg) by mouth every 12 hours. 180 tablet 1    insulin glargine (Lantus Solostar U-100 Insulin) 100 unit/mL (3 mL) pen Inject 20 Units under the skin once daily at bedtime. 15 mL 1    lipase-protease-amylase (Creon) 24,000-76,000 -120,000 unit capsule TAKE UP TO 3 CAPSULES BY MOUTH THREE TIMES A DAY WITH MEALS AND 2 CAPSULES WITH SNACKS. 1440 capsule 2    metoprolol succinate XL (Toprol-XL) 50 mg 24 hr tablet Take 1 tablet (50 mg) by mouth once daily. 90 tablet 1    pen needle, diabetic (BD Ultra-Fine Mini Pen Needle) 31 gauge x 3/16\" needle USE DAILY FOR INSULIN INJECTIONS 100 each 1    SITagliptin phosphate (Januvia) 50 mg tablet Take 1 tablet (50 mg) by mouth once daily. 90 tablet 1    [DISCONTINUED] atorvastatin (Lipitor) 40 mg tablet Take 1 tablet (40 mg) by mouth once daily. 90 tablet 1    [DISCONTINUED] insulin glargine (Lantus Solostar U-100 Insulin) 100 unit/mL (3 mL) pen Inject 20 Units under the skin once daily at bedtime. 15 mL 1    [DISCONTINUED] melatonin 3 mg tablet Take by mouth.       No current facility-administered medications on file prior to visit.       Medications and Supplements  prescribed by me and other practitioners or clinical pharmacist (such as prescriptions, " "OTC's, herbal therapies and supplements) were reviewed and documented in the medical record.    Tobacco/Alcohol/Opioid use, as well as Illicit Drug Use was screened for/reviewed and documented in Social History section and medication list as appropriate  Activities of Daily Living  In your present state of health, do you have any difficulty performing the following activities?:   Preparing food and eating?: No  Bathing yourself: No  Getting dressed: No  Using the toilet:No  Moving around from place to place: No  In the past year have you fallen or had a near fall?:No    Depression Screen  (Note: if answer to either of the following is \"Yes\", then a more complete depression screening is indicated)   Q1: Over the past two weeks, have you felt down, depressed or hopeless? No  Q2: Over the past two weeks, have you felt little interest or pleasure in doing things? No    Current exercise habits: The patient does not participate in regular exercise at present.   Dietary issues discussed: Yes  Hearing difficulties: no  Safe in current home environment: yes  Visual Acuity assessed: no  Cognitive Impairment assessed: no       Advance directives  Advanced Care Planning (including a Living Will, Healthcare POA, as well as specific end of life choices and/or directives), was discussed for approximately 16 minutes with the patient and/or surrogate, voluntarily, and documented in the medical record.     Cardiac Risk Assessment  Cardiovascular risk was discussed and, if needed, lifestyle modifications recommended, including nutritional choices, exercise, and elimination of habits contributing to risk. We agreed on a plan to reduce the current cardiovascular risk based on above discussion as needed.  Aspirin use/disuse was discussed after reviewing the updated guidelines below:    Consider low dose Aspirin ( mg) use if the benefit for cardiovascular disease prevention outweighs risk for bleeding complications.   In general, " low dose ASA should be considered:  In patients WITHOUT prior MI/stroke/PAD (primary prevention):   a. Age <60: Use if 10-year cardiovascular disease risk >20%, with discussion of risks and benefits with patient  b. Age 60-<70: Use if 10-year cardiovascular disease risk >20% and low bleeding (e.g., gastrointenstinal) risk, with discussion of risks and benefits with patient  c. Age >=70: Do not use    In patients WITH prior MI/stroke/PAD (secondary prevention):   Generally use unless extremely high bleeding (e.g., gastrointenstinal) risk, with discussion of risks and benefits with patient    ROS otherwise negative aside from what was mentioned above in HPI.  Health Maintenance  Colonoscopy: 2023  Mammogram: []  Pap smear/Pelvic Exam: []  DEXA: []  Low dose chest CT: []   Screening Abdominal US: []  Opthalmology: []  Podiatry: []    Immunizations  Influenza: []  Pneumovax: []  Prevnar 13: []  Td/Tdap: []  Zostavax: []            ROS:  Constitutional: [] denies fever/night sweats/weight loss/fatigue/insomnia  Head: [] denies trauma/headache/masses  Eyes: [] denies loss of vision/blurry vision/diplopia/redness/eye pain  Ears: [] denies hearing loss/tinnitus/masses/pain/otorrhea  Nose: [] denies anosmia/masses/epistaxis/drainage  Throat: [] denies dysphagia/odynophagia  Neck: [] denies masses/asymmetry  Lymphatics: [] denies lymph node swelling  Cardiovascular: [] denies CP/orthopnea/PND/leg edema/palpitations  Pulmonary: [] denies SOB/dyspnea with exertion/cough/sputum production/hemoptysis/wheezing  GI: [] denies abdominal pain/nausea/vomiting/dysphagia/odynophagia/melena/hematochezia/diarrhea/constipation/change in stool caliber/bowel incontinence  : [] denies dysuria/hematuria/increased frequency/nocturia/dribbling/urinary incontinence  Genital: [] Erectile dysfunction denies discharge/masses/lesions  Musculoskeletal: [] denies trauma/arthralgia/myalgia/deformity/joint swelling  Hematologic: [] denies easy bruising  or bleeding  Neurologic: [] denies gait imbalance/paresthesias/saddle paresthesia/focal weakness/dysarthria/seizure  Skin: [Skin dryness left lower extremity] denies masses/lesions/rashes/tattoos  Psychiatric: [] denies depression/suicidal or homicidal thoughts    Vitals  Vitals:    02/21/24 1339   BP: 136/82   Pulse:    SpO2:      Body mass index is 23.04 kg/m².  Physical Exam  Gen: Alert, NAD  HEENT:  PERRLA, EOMI, conjunctiva and sclera normal in appearance. External auditory canals/TMs normal; Oral cavity and posterior pharynx without lesions/exudate  Neck:  Supple with FROM; No masses/nodes palpable; Thyroid nontender and without nodules; No MARICARMEN  Respiratory:  Lungs CTAB  Cardiovascular:  Heart RRR. No M/R/G. Peripheral pulses equal bilaterally  Abdomen:  Soft, nontender, BS present throughout; No R/G/R; No HSM or masses palpated  Extremities: 2+ palpable DP PT bilateral lower extremities FROM all extremities; Muscle strength grossly normal with good tone  Neuro:  CN II-XII intact; Reflexes 2+/2+; Gross motor and sensory intact  Skin: Patchy dry skin left lower extremity hyperpigmentation without warmth tenderness no suspicious lesions present  Breast: No masses, skin lesions or nipple discharges, no axillary lymphadenopathy      Patient states his glucose is labile sometimes gets low readings under 70 sometimes very high readings therefore I advised him to follow-up with endocrinology given hypoglycemia intermixed  Assessment and Plan:  Problem List Items Addressed This Visit       Type 2 diabetes mellitus (CMS/[facility]) - Primary    Relevant Orders    Hemoglobin A1C    Referral to Endocrinology    Erectile dysfunction    Relevant Medications    tadalafil (Cialis) 10 mg tablet    Pulmonary HTN (CMS/[facility])    Overview     Formatting of this note might be different from the original. Mild per echo 2016         Current Assessment & Plan     Increase exercise 30 minutes a day         Aortoiliac occlusive disease  (CMS/[facility])    Current Assessment & Plan     Increase exercise 30 minutes a day         Emphysema, unspecified (CMS/[facility])    Current Assessment & Plan     Avoid tobacco smoke         Acute on chronic pancreatitis (CMS/[facility])    Current Assessment & Plan     Not active at present         Type 2 diabetes mellitus with other circulatory complication, with long-term current use of insulin (CMS/[facility])    Current Assessment & Plan     Call follow-up with endocrinology goal A1c less than 7%          Other Visit Diagnoses       Colon cancer screening        Relevant Orders    Colonoscopy Screening; Average Risk Patient    Dry skin dermatitis        Relevant Medications    ammonium lactate (Amlactin) 12 % cream          Call follow-up with hematology as planned May 2    Call and follow-up with cardiology as planned    Follow-up with nephrology call    Screening blood work due August 2024    Thank you for making appointment today Flip    Please follow-up 6 months    Tim Hoffman DO, MATILDE        During the course of the visit the patient was educated and counseled about age appropriate screening and preventive services. Completed preventive screenings were documented in the chart and orders were placed for outstanding screenings/procedures as documented in the Assessment and Plan.      Patient Instructions (the written plan) was given to the patient at check out.      Tim Hoffman DO

## 2024-02-28 ENCOUNTER — LAB (OUTPATIENT)
Dept: LAB | Facility: LAB | Age: 69
End: 2024-02-28
Payer: MEDICARE

## 2024-02-28 DIAGNOSIS — Z79.4 TYPE 2 DIABETES MELLITUS WITHOUT COMPLICATION, WITH LONG-TERM CURRENT USE OF INSULIN (MULTI): ICD-10-CM

## 2024-02-28 DIAGNOSIS — E11.9 TYPE 2 DIABETES MELLITUS WITHOUT COMPLICATION, WITH LONG-TERM CURRENT USE OF INSULIN (MULTI): ICD-10-CM

## 2024-02-28 DIAGNOSIS — I10 HYPERTENSION, UNSPECIFIED TYPE: ICD-10-CM

## 2024-02-28 LAB
EST. AVERAGE GLUCOSE BLD GHB EST-MCNC: 180 MG/DL
HBA1C MFR BLD: 7.9 %

## 2024-02-28 PROCEDURE — 36415 COLL VENOUS BLD VENIPUNCTURE: CPT

## 2024-02-28 PROCEDURE — 83036 HEMOGLOBIN GLYCOSYLATED A1C: CPT

## 2024-02-28 RX ORDER — METOPROLOL SUCCINATE 50 MG/1
50 TABLET, EXTENDED RELEASE ORAL DAILY
Qty: 90 TABLET | Refills: 1 | Status: SHIPPED | OUTPATIENT
Start: 2024-02-28

## 2024-03-03 DIAGNOSIS — E11.9 TYPE 2 DIABETES MELLITUS WITHOUT COMPLICATION, WITHOUT LONG-TERM CURRENT USE OF INSULIN (MULTI): ICD-10-CM

## 2024-03-03 DIAGNOSIS — I10 HYPERTENSION, UNSPECIFIED TYPE: ICD-10-CM

## 2024-03-04 RX ORDER — SITAGLIPTIN 50 MG/1
50 TABLET, FILM COATED ORAL DAILY
Qty: 90 TABLET | Refills: 1 | Status: SHIPPED | OUTPATIENT
Start: 2024-03-04

## 2024-03-04 RX ORDER — HYDRALAZINE HYDROCHLORIDE 10 MG/1
10 TABLET, FILM COATED ORAL EVERY 12 HOURS
Qty: 180 TABLET | Refills: 1 | Status: SHIPPED | OUTPATIENT
Start: 2024-03-04

## 2024-03-08 ENCOUNTER — TELEPHONE (OUTPATIENT)
Dept: PRIMARY CARE | Facility: CLINIC | Age: 69
End: 2024-03-08
Payer: MEDICARE

## 2024-03-08 NOTE — TELEPHONE ENCOUNTER
Spoke with patient and notified of results and recommendations.    ----- Message from Tim Hoffman DO sent at 3/6/2024  5:57 PM EST -----  Esperanza  Please notify patient A1c improving down to 7.9 from 8.6 goal less than 7% should call and follow-up with endocrinology as his next step for ongoing management and work on a low sugar diet thank you if any further questions or would like an in office result review please schedule follow-up in the next 2 to 4 weeks

## 2024-04-09 ENCOUNTER — DOCUMENTATION (OUTPATIENT)
Dept: HEMATOLOGY/ONCOLOGY | Facility: HOSPITAL | Age: 69
End: 2024-04-09
Payer: MEDICARE

## 2024-04-09 NOTE — PROGRESS NOTES
Diagnosis anemia. History includes-AAA, HTN, COPD, CKD, diabetes, GERD, HLD, PVD, acute on chronic pancreatitis, PAD, LVH, pulmonary hypertension, IBS, chronic diarrhea.  Last labs 8/17/23 RBC 4.60, HGB 12.7, Hematocrit 40.6, MCHC 31.3, BUN 32, Creat 2.33  Patient confirmed his appointment for 5/2/24.

## 2024-04-15 DIAGNOSIS — I10 HYPERTENSION, UNSPECIFIED TYPE: ICD-10-CM

## 2024-04-16 RX ORDER — AMLODIPINE BESYLATE 10 MG/1
10 TABLET ORAL DAILY
Qty: 90 TABLET | Refills: 1 | Status: SHIPPED | OUTPATIENT
Start: 2024-04-16

## 2024-05-02 ENCOUNTER — OFFICE VISIT (OUTPATIENT)
Dept: HEMATOLOGY/ONCOLOGY | Facility: CLINIC | Age: 69
End: 2024-05-02
Payer: MEDICARE

## 2024-05-02 ENCOUNTER — LAB (OUTPATIENT)
Dept: LAB | Facility: CLINIC | Age: 69
End: 2024-05-02
Payer: MEDICARE

## 2024-05-02 VITALS
OXYGEN SATURATION: 99 % | BODY MASS INDEX: 23.18 KG/M2 | WEIGHT: 156.97 LBS | DIASTOLIC BLOOD PRESSURE: 82 MMHG | TEMPERATURE: 97.9 F | RESPIRATION RATE: 18 BRPM | SYSTOLIC BLOOD PRESSURE: 155 MMHG | HEART RATE: 68 BPM

## 2024-05-02 DIAGNOSIS — D50.8 OTHER IRON DEFICIENCY ANEMIA: ICD-10-CM

## 2024-05-02 DIAGNOSIS — N18.32 STAGE 3B CHRONIC KIDNEY DISEASE (MULTI): ICD-10-CM

## 2024-05-02 DIAGNOSIS — N18.32 STAGE 3B CHRONIC KIDNEY DISEASE (MULTI): Primary | ICD-10-CM

## 2024-05-02 LAB
ALBUMIN SERPL BCP-MCNC: 3.2 G/DL (ref 3.4–5)
ALP SERPL-CCNC: 130 U/L (ref 33–136)
ALT SERPL W P-5'-P-CCNC: 40 U/L (ref 10–52)
ANION GAP SERPL CALC-SCNC: 14 MMOL/L (ref 10–20)
AST SERPL W P-5'-P-CCNC: 37 U/L (ref 9–39)
B2 MICROGLOB SERPL-MCNC: 7.3 MG/L (ref 0.7–2.2)
BASOPHILS # BLD AUTO: 0.09 X10*3/UL (ref 0–0.1)
BASOPHILS NFR BLD AUTO: 0.8 %
BILIRUB SERPL-MCNC: 0.2 MG/DL (ref 0–1.2)
BUN SERPL-MCNC: 49 MG/DL (ref 6–23)
CALCIUM SERPL-MCNC: 8 MG/DL (ref 8.6–10.6)
CHLORIDE SERPL-SCNC: 114 MMOL/L (ref 98–107)
CO2 SERPL-SCNC: 18 MMOL/L (ref 21–32)
CREAT SERPL-MCNC: 2.99 MG/DL (ref 0.5–1.3)
EGFRCR SERPLBLD CKD-EPI 2021: 22 ML/MIN/1.73M*2
EOSINOPHIL # BLD AUTO: 0.47 X10*3/UL (ref 0–0.7)
EOSINOPHIL NFR BLD AUTO: 4.3 %
ERYTHROCYTE [DISTWIDTH] IN BLOOD BY AUTOMATED COUNT: 14.3 % (ref 11.5–14.5)
FERRITIN SERPL-MCNC: 206 NG/ML (ref 20–300)
FOLATE SERPL-MCNC: 4.8 NG/ML
GLUCOSE SERPL-MCNC: 137 MG/DL (ref 74–99)
HCT VFR BLD AUTO: 38.9 % (ref 41–52)
HGB BLD-MCNC: 12.2 G/DL (ref 13.5–17.5)
HGB RETIC QN: 31 PG (ref 28–38)
IMM GRANULOCYTES # BLD AUTO: 0.03 X10*3/UL (ref 0–0.7)
IMM GRANULOCYTES NFR BLD AUTO: 0.3 % (ref 0–0.9)
IMMATURE RETIC FRACTION: 7.4 %
IRON SATN MFR SERPL: 28 % (ref 25–45)
IRON SERPL-MCNC: 71 UG/DL (ref 35–150)
LDH SERPL L TO P-CCNC: 189 U/L (ref 84–246)
LYMPHOCYTES # BLD AUTO: 1.96 X10*3/UL (ref 1.2–4.8)
LYMPHOCYTES NFR BLD AUTO: 18 %
MCH RBC QN AUTO: 27.3 PG (ref 26–34)
MCHC RBC AUTO-ENTMCNC: 31.4 G/DL (ref 32–36)
MCV RBC AUTO: 87 FL (ref 80–100)
MONOCYTES # BLD AUTO: 0.97 X10*3/UL (ref 0.1–1)
MONOCYTES NFR BLD AUTO: 8.9 %
NEUTROPHILS # BLD AUTO: 7.36 X10*3/UL (ref 1.2–7.7)
NEUTROPHILS NFR BLD AUTO: 67.7 %
NRBC BLD-RTO: ABNORMAL /100{WBCS}
PLATELET # BLD AUTO: 261 X10*3/UL (ref 150–450)
POTASSIUM SERPL-SCNC: 4.7 MMOL/L (ref 3.5–5.3)
PROT SERPL-MCNC: 6 G/DL (ref 6.4–8.2)
PROT SERPL-MCNC: 6 G/DL (ref 6.4–8.2)
RBC # BLD AUTO: 4.47 X10*6/UL (ref 4.5–5.9)
RETICS #: 0.04 X10*6/UL (ref 0.02–0.11)
RETICS/RBC NFR AUTO: 1 % (ref 0.5–2)
SODIUM SERPL-SCNC: 141 MMOL/L (ref 136–145)
TIBC SERPL-MCNC: 250 UG/DL (ref 240–445)
TSH SERPL-ACNC: 1.54 MIU/L (ref 0.44–3.98)
UIBC SERPL-MCNC: 179 UG/DL (ref 110–370)
VIT B12 SERPL-MCNC: 929 PG/ML (ref 211–911)
WBC # BLD AUTO: 10.9 X10*3/UL (ref 4.4–11.3)

## 2024-05-02 PROCEDURE — 84443 ASSAY THYROID STIM HORMONE: CPT | Performed by: NURSE PRACTITIONER

## 2024-05-02 PROCEDURE — 3051F HG A1C>EQUAL 7.0%<8.0%: CPT | Performed by: NURSE PRACTITIONER

## 2024-05-02 PROCEDURE — 84165 PROTEIN E-PHORESIS SERUM: CPT | Performed by: NURSE PRACTITIONER

## 2024-05-02 PROCEDURE — 86334 IMMUNOFIX E-PHORESIS SERUM: CPT | Performed by: NURSE PRACTITIONER

## 2024-05-02 PROCEDURE — 82746 ASSAY OF FOLIC ACID SERUM: CPT | Performed by: NURSE PRACTITIONER

## 2024-05-02 PROCEDURE — 83521 IG LIGHT CHAINS FREE EACH: CPT | Performed by: NURSE PRACTITIONER

## 2024-05-02 PROCEDURE — 82232 ASSAY OF BETA-2 PROTEIN: CPT | Performed by: NURSE PRACTITIONER

## 2024-05-02 PROCEDURE — 36415 COLL VENOUS BLD VENIPUNCTURE: CPT

## 2024-05-02 PROCEDURE — 99213 OFFICE O/P EST LOW 20 MIN: CPT | Performed by: NURSE PRACTITIONER

## 2024-05-02 PROCEDURE — 82607 VITAMIN B-12: CPT | Performed by: NURSE PRACTITIONER

## 2024-05-02 PROCEDURE — 85025 COMPLETE CBC W/AUTO DIFF WBC: CPT

## 2024-05-02 PROCEDURE — 85045 AUTOMATED RETICULOCYTE COUNT: CPT | Performed by: NURSE PRACTITIONER

## 2024-05-02 PROCEDURE — 3079F DIAST BP 80-89 MM HG: CPT | Performed by: NURSE PRACTITIONER

## 2024-05-02 PROCEDURE — 83550 IRON BINDING TEST: CPT | Mod: 91 | Performed by: NURSE PRACTITIONER

## 2024-05-02 PROCEDURE — 1126F AMNT PAIN NOTED NONE PRSNT: CPT | Performed by: NURSE PRACTITIONER

## 2024-05-02 PROCEDURE — 84132 ASSAY OF SERUM POTASSIUM: CPT | Mod: 91 | Performed by: NURSE PRACTITIONER

## 2024-05-02 PROCEDURE — 1159F MED LIST DOCD IN RCRD: CPT | Performed by: NURSE PRACTITIONER

## 2024-05-02 PROCEDURE — 84155 ASSAY OF PROTEIN SERUM: CPT | Performed by: NURSE PRACTITIONER

## 2024-05-02 PROCEDURE — 82668 ASSAY OF ERYTHROPOIETIN: CPT

## 2024-05-02 PROCEDURE — 82728 ASSAY OF FERRITIN: CPT | Performed by: NURSE PRACTITIONER

## 2024-05-02 PROCEDURE — 1160F RVW MEDS BY RX/DR IN RCRD: CPT | Performed by: NURSE PRACTITIONER

## 2024-05-02 PROCEDURE — 3077F SYST BP >= 140 MM HG: CPT | Performed by: NURSE PRACTITIONER

## 2024-05-02 PROCEDURE — 83615 LACTATE (LD) (LDH) ENZYME: CPT | Performed by: NURSE PRACTITIONER

## 2024-05-02 PROCEDURE — 86320 SERUM IMMUNOELECTROPHORESIS: CPT | Performed by: NURSE PRACTITIONER

## 2024-05-02 ASSESSMENT — PAIN SCALES - GENERAL: PAINLEVEL: 0-NO PAIN

## 2024-05-02 NOTE — PROGRESS NOTES
Patient ID: Flip Murphy is a 68 y.o. male.  Referring Physician: No referring provider defined for this encounter.  Primary Care Provider: Tim Hoffman DO  Visit Type: Initial Visit      Subjective    Location:  [facility]  Initial consult: 5/2/2024  Reason: Anemia    Patient is a 67 yo  man with a PMH of DM, CKD, elevated LFT's former 50 pack year smoker, AAA, HTN, hyperlipidemia, carotid stenosis, PVD, pulmonary HTN,  referred to benign hematology for anemia consult by Dr. Hoffman.    Today, patient presents for initial consultation. Denies abnormal weight loss, night sweats, fever. Denies fatigue, chills, sob, cp, n/v/d, n/t. No PICA. Denies any abnormal bleeding or bruising. No recurrent infections or lymphadenopathy. No joint/body pain. No known blood disorders in family. Has had surgery in past w/o issue. Never had blood/blood products. Denies NSAID use.     Review of labs note a mild normocytic anemia. Last labs were drawn in August 2023. Hgb 12.7, MCV 88, remainder of CBC/diff WNL at that time. Last GFR 30, creatinine 2.33, hgb A1C 7.9.    Objective   BSA: 1.86 meters squared  /82 (BP Location: Left arm, Patient Position: Sitting, BP Cuff Size: Adult)   Pulse 68   Temp 36.6 °C (97.9 °F) (Temporal)   Resp 18   Wt 71.2 kg (156 lb 15.5 oz)   SpO2 99%   BMI 23.18 kg/m²      has a past medical history of Abdominal distension (gaseous) (07/16/2019), Change in bowel habit (07/16/2019), Elevated plasma metanephrines (05/19/2016), Encounter for other preprocedural examination, Generalized abdominal pain (07/05/2019), Hypertensive urgency (11/26/2022), Irritable bowel syndrome with constipation (07/02/2019), Leukocytosis (08/16/2023), Other fecal abnormalities (06/27/2019), Other specified diseases of intestine (09/05/2019), Other specified postprocedural states, Personal history of other diseases of the digestive system (05/31/2019), S/P aortobifemoral bypass surgery (03/03/2016),  Secondary diabetes (Multi) (08/16/2023), Small intestinal bacterial overgrowth (08/16/2023), Tobacco abuse counseling (02/27/2019), and Tobacco use (05/06/2020).   has a past surgical history that includes Other surgical history (07/05/2019) and CT angio neck (3/24/2017).    Family Hx: Patient adopted. 1 brother and not aware of health issues. No children.    Social Hx: Semi retired, works part time as a . Single, lives alone. 50+ pack year smoking history. Quit 1.5 years ago. No ETOH or recreational drug use.    Flip Murphy  reports that he quit smoking about 13 months ago. His smoking use included cigarettes. He started smoking about 47 years ago. He has a 46 pack-year smoking history. He has never used smokeless tobacco.  He  reports that he does not currently use alcohol.  He  reports that he does not currently use drugs.    Physical Exam  HENT:      Head: Normocephalic.      Nose: Nose normal.      Mouth/Throat:      Mouth: Mucous membranes are moist.   Eyes:      Pupils: Pupils are equal, round, and reactive to light.   Cardiovascular:      Rate and Rhythm: Normal rate and regular rhythm.      Pulses: Normal pulses.      Heart sounds: Normal heart sounds.   Pulmonary:      Effort: Pulmonary effort is normal.      Breath sounds: Normal breath sounds.   Abdominal:      General: Bowel sounds are normal.      Palpations: Abdomen is soft.   Musculoskeletal:         General: Normal range of motion.   Skin:     General: Skin is warm and dry.   Neurological:      General: No focal deficit present.      Mental Status: He is alert and oriented to person, place, and time.   Psychiatric:         Mood and Affect: Mood normal.         Behavior: Behavior normal.         WBC   Date/Time Value Ref Range Status   08/17/2023 01:22 PM 11.2 4.4 - 11.3 x10E9/L Final   09/21/2022 03:11 PM 10.3 4.4 - 11.3 x10E9/L Final   06/30/2021 03:01 PM 10.6 4.4 - 11.3 x10E9/L Final     nRBC   Date Value Ref Range Status   08/17/2023  "0.0 0.0 - 0.0 /100 WBC Final   09/21/2022 0.0 0.0 - 0.0 /100 WBC Final   06/30/2021 0.0 0.0 - 0.0 /100 WBC Final     RBC   Date Value Ref Range Status   08/17/2023 4.60 4.50 - 5.90 x10E12/L Final   09/21/2022 4.78 4.50 - 5.90 x10E12/L Final   06/30/2021 5.39 4.50 - 5.90 x10E12/L Final     Hemoglobin   Date Value Ref Range Status   08/17/2023 12.7 (L) 13.5 - 17.5 g/dL Final   09/21/2022 13.4 (L) 13.5 - 17.5 g/dL Final   06/30/2021 15.6 13.5 - 17.5 g/dL Final     Hematocrit   Date Value Ref Range Status   08/17/2023 40.6 (L) 41.0 - 52.0 % Final   09/21/2022 43.0 41.0 - 52.0 % Final   06/30/2021 48.9 41.0 - 52.0 % Final     MCV   Date/Time Value Ref Range Status   08/17/2023 01:22 PM 88 80 - 100 fL Final   09/21/2022 03:11 PM 90 80 - 100 fL Final   06/30/2021 03:01 PM 91 80 - 100 fL Final     No results found for: \"MCH\"  MCHC   Date/Time Value Ref Range Status   08/17/2023 01:22 PM 31.3 (L) 32.0 - 36.0 g/dL Final   09/21/2022 03:11 PM 31.2 (L) 32.0 - 36.0 g/dL Final   06/30/2021 03:01 PM 31.9 (L) 32.0 - 36.0 g/dL Final     RDW   Date/Time Value Ref Range Status   08/17/2023 01:22 PM 13.7 11.5 - 14.5 % Final   09/21/2022 03:11 PM 14.4 11.5 - 14.5 % Final   06/30/2021 03:01 PM 14.3 11.5 - 14.5 % Final     Platelets   Date/Time Value Ref Range Status   08/17/2023 01:22  150 - 450 x10E9/L Final   09/21/2022 03:11  150 - 450 x10E9/L Final   06/30/2021 03:01  150 - 450 x10E9/L Final     No results found for: \"MPV\"  Neutrophils %   Date/Time Value Ref Range Status   08/17/2023 01:22 PM 63.3 40.0 - 80.0 % Final   06/30/2021 03:01 PM 64.2 40.0 - 80.0 % Final   02/11/2020 03:31 PM 63.9 40.0 - 80.0 % Final     Immature Granulocytes %, Automated   Date/Time Value Ref Range Status   08/17/2023 01:22 PM 0.3 0.0 - 0.9 % Final     Comment:      Immature Granulocyte Count (IG) includes promyelocytes,    myelocytes and metamyelocytes but does not include bands.   Percent differential counts (%) should be interpreted in " "the   context of the absolute cell counts (cells/L).   06/30/2021 03:01 PM 0.5 0.0 - 0.9 % Final     Comment:      Immature Granulocyte Count (IG) includes promyelocytes,    myelocytes and metamyelocytes but does not include bands.   Percent differential counts (%) should be interpreted in the   context of the absolute cell counts (cells/L).     02/11/2020 03:31 PM 0.4 0.0 - 0.9 % Final     Comment:      Percent differential counts (%) should be interpreted in the   context of the absolute cell counts (cells/L).       Lymphocytes %   Date/Time Value Ref Range Status   08/17/2023 01:22 PM 25.2 13.0 - 44.0 % Final   06/30/2021 03:01 PM 25.1 13.0 - 44.0 % Final   02/11/2020 03:31 PM 24.7 13.0 - 44.0 % Final     Monocytes %   Date/Time Value Ref Range Status   08/17/2023 01:22 PM 8.5 2.0 - 10.0 % Final   06/30/2021 03:01 PM 7.6 2.0 - 10.0 % Final   02/11/2020 03:31 PM 7.9 2.0 - 10.0 % Final     Eosinophils %   Date/Time Value Ref Range Status   08/17/2023 01:22 PM 1.9 0.0 - 6.0 % Final   06/30/2021 03:01 PM 1.8 0.0 - 6.0 % Final   02/11/2020 03:31 PM 2.1 0.0 - 6.0 % Final     Basophils %   Date/Time Value Ref Range Status   08/17/2023 01:22 PM 0.8 0.0 - 2.0 % Final   06/30/2021 03:01 PM 0.8 0.0 - 2.0 % Final   02/11/2020 03:31 PM 1.0 0.0 - 2.0 % Final     Neutrophils Absolute   Date/Time Value Ref Range Status   08/17/2023 01:22 PM 7.08 1.20 - 7.70 x10E9/L Final   06/30/2021 03:01 PM 6.81 1.20 - 7.70 x10E9/L Final   02/11/2020 03:31 PM 7.32 1.20 - 7.70 x10E9/L Final     No results found for: \"IGABSOL\"  Lymphocytes Absolute   Date/Time Value Ref Range Status   08/17/2023 01:22 PM 2.81 1.20 - 4.80 x10E9/L Final   06/30/2021 03:01 PM 2.66 1.20 - 4.80 x10E9/L Final   02/11/2020 03:31 PM 2.83 1.20 - 4.80 x10E9/L Final     Monocytes Absolute   Date/Time Value Ref Range Status   08/17/2023 01:22 PM 0.95 0.10 - 1.00 x10E9/L Final   06/30/2021 03:01 PM 0.80 0.10 - 1.00 x10E9/L Final   02/11/2020 03:31 PM 0.90 0.10 - 1.00 x10E9/L " Final     Eosinophils Absolute   Date/Time Value Ref Range Status   08/17/2023 01:22 PM 0.21 0.00 - 0.70 x10E9/L Final   06/30/2021 03:01 PM 0.19 0.00 - 0.70 x10E9/L Final   02/11/2020 03:31 PM 0.24 0.00 - 0.70 x10E9/L Final     Basophils Absolute   Date/Time Value Ref Range Status   08/17/2023 01:22 PM 0.09 0.00 - 0.10 x10E9/L Final   06/30/2021 03:01 PM 0.08 0.00 - 0.10 x10E9/L Final   02/11/2020 03:31 PM 0.12 (H) 0.00 - 0.10 x10E9/L Final     Assessment/Plan    67 yo  man with a PMH of DM, CKD, elevated LFT's former 50 pack year smoker, AAA, HTN, hyperlipidemia, carotid stenosis, PVD, pulmonary HTN,  referred to benign hematology for anemia consult by Dr. Hoffman.     Review of labs note a mild normocytic anemia. Last labs were drawn in August 2023. Hgb 12.7, MCV 88, remainder of CBC/diff WNL at that time. Last GFR 30, creatinine 2.33, hgb A1C 7.9.    Discussed possible etiologies including multifactorial, nutritional deficiencies, anemia of chronic disease, malabsorption, hemopathy, medications, bleeding, malignancy, etc. Will start hematological workup today.    Plan:  Labs today: CBC/diff, CMP, EPO, Iron Studies, b12, Folic Acid, TSH, Vitamin D, SPEP, Arthritis panel   We will schedule a phone visit in 1 week to discuss results and make further recommendations.     I had an extensive discussion with the patient regarding the diagnosis and discussed the plan of therapy, including general considerations regarding side effects and outcomes. Pt understood and gave appropriate teach back about the plan of care. All questions were answered to the patient's satisfaction. The patient is instructed to contact us at any time if questions or problems arise. Thank you for the opportunity to participate in the care of this very pleasant patient.      Rosanne M Casal, APRN-CNP, DNP

## 2024-05-02 NOTE — PROGRESS NOTES
Patient stated her was referred by Dr Hoffman about a year ago. He noticed on blood work anemia. Patient denies any symptoms. He states he stays active and eats well.   ANNETTE Toro RN

## 2024-05-03 LAB
KAPPA LC SERPL-MCNC: 10.05 MG/DL (ref 0.33–1.94)
KAPPA LC/LAMBDA SER: 1.59 {RATIO} (ref 0.26–1.65)
LAMBDA LC SERPL-MCNC: 6.34 MG/DL (ref 0.57–2.63)

## 2024-05-04 LAB — EPO SERPL-ACNC: 10 MU/ML (ref 4–27)

## 2024-05-07 ENCOUNTER — TELEMEDICINE (OUTPATIENT)
Dept: HEMATOLOGY/ONCOLOGY | Facility: CLINIC | Age: 69
End: 2024-05-07
Payer: MEDICARE

## 2024-05-07 DIAGNOSIS — N18.32 STAGE 3B CHRONIC KIDNEY DISEASE (MULTI): ICD-10-CM

## 2024-05-07 DIAGNOSIS — D50.8 OTHER IRON DEFICIENCY ANEMIA: ICD-10-CM

## 2024-05-07 LAB
ALBUMIN: 2.8 G/DL (ref 3.4–5)
ALPHA 1 GLOBULIN: 0.3 G/DL (ref 0.2–0.6)
ALPHA 2 GLOBULIN: 1 G/DL (ref 0.4–1.1)
BETA GLOBULIN: 0.8 G/DL (ref 0.5–1.2)
GAMMA GLOBULIN: 1.1 G/DL (ref 0.5–1.4)
IMMUNOFIXATION COMMENT: ABNORMAL
PATH REVIEW - SERUM IMMUNOFIXATION: ABNORMAL
PATH REVIEW-SERUM PROTEIN ELECTROPHORESIS: ABNORMAL
PROTEIN ELECTROPHORESIS COMMENT: ABNORMAL

## 2024-05-07 PROCEDURE — 1160F RVW MEDS BY RX/DR IN RCRD: CPT | Performed by: NURSE PRACTITIONER

## 2024-05-07 PROCEDURE — 1159F MED LIST DOCD IN RCRD: CPT | Performed by: NURSE PRACTITIONER

## 2024-05-07 PROCEDURE — 99213 OFFICE O/P EST LOW 20 MIN: CPT | Performed by: NURSE PRACTITIONER

## 2024-05-07 PROCEDURE — 3051F HG A1C>EQUAL 7.0%<8.0%: CPT | Performed by: NURSE PRACTITIONER

## 2024-05-07 RX ORDER — FOLIC ACID 1 MG/1
1 TABLET ORAL DAILY
Qty: 30 TABLET | Refills: 6 | Status: SHIPPED | OUTPATIENT
Start: 2024-05-07 | End: 2025-05-07

## 2024-05-07 NOTE — PROGRESS NOTES
Patient ID: Flip Murphy is a 68 y.o. male.  Referring Physician: Rosanne M Casal, APRN-CNP, DNP  [id number] [place] [address]  Primary Care Provider: Tim Hoffman DO  Visit Type:  Follow Up     Verbal consent was requested and obtained from patient on this date for a telehealth visit.    Subjective    Location:  [facility]  Initial consult: 5/2/2024  Reason: Anemia     Patient is a 67 yo  man with a PMH of DM, CKD, elevated LFT's former 50 pack year smoker, AAA, HTN, hyperlipidemia, carotid stenosis, PVD, pulmonary HTN,  referred to benign hematology for anemia consult by Dr. Hoffman.    Patient called for a telephone follow up visit to review labs from previous visit.  Denies abnormal weight loss, night sweats, fever. Denies fatigue, chills, sob, cp, n/v/d, n/t. No PICA. Denies any abnormal bleeding or bruising. No recurrent infections or lymphadenopathy. No joint/body pain. No known blood disorders in family. Has had surgery in past w/o issue. Never had blood/blood products. Denies NSAID use.      Review of labs note a mild normocytic anemia. Last labs were drawn in August 2023. Hgb 12.7, MCV 88, remainder of CBC/diff WNL at that time. Last GFR 30, creatinine 2.33, hgb A1C 7.9.    Objective   BSA: There is no height or weight on file to calculate BSA.  There were no vitals taken for this visit.     has a past medical history of Abdominal distension (gaseous) (07/16/2019), Change in bowel habit (07/16/2019), Elevated plasma metanephrines (05/19/2016), Encounter for other preprocedural examination, Generalized abdominal pain (07/05/2019), Hypertensive urgency (11/26/2022), Irritable bowel syndrome with constipation (07/02/2019), Leukocytosis (08/16/2023), Other fecal abnormalities (06/27/2019), Other specified diseases of intestine (09/05/2019), Other specified postprocedural states, Personal history of other diseases of the digestive system (05/31/2019), S/P aortobifemoral bypass  surgery (03/03/2016), Secondary diabetes (Multi) (08/16/2023), Small intestinal bacterial overgrowth (08/16/2023), Tobacco abuse counseling (02/27/2019), and Tobacco use (05/06/2020).   has a past surgical history that includes Other surgical history (07/05/2019) and CT angio neck (3/24/2017).  No family history on file.  Oncology History    No history exists.       Flip Murphy  reports that he quit smoking about 13 months ago. His smoking use included cigarettes. He started smoking about 47 years ago. He has a 46 pack-year smoking history. He has never used smokeless tobacco.  He  reports that he does not currently use alcohol.  He  reports that he does not currently use drugs.        WBC   Date/Time Value Ref Range Status   05/02/2024 09:54 AM 10.9 4.4 - 11.3 x10*3/uL Final   08/17/2023 01:22 PM 11.2 4.4 - 11.3 x10E9/L Final   09/21/2022 03:11 PM 10.3 4.4 - 11.3 x10E9/L Final   06/30/2021 03:01 PM 10.6 4.4 - 11.3 x10E9/L Final     nRBC   Date Value Ref Range Status   05/02/2024   Final     Comment:     Not Measured   08/17/2023 0.0 0.0 - 0.0 /100 WBC Final   09/21/2022 0.0 0.0 - 0.0 /100 WBC Final   06/30/2021 0.0 0.0 - 0.0 /100 WBC Final     RBC   Date Value Ref Range Status   05/02/2024 4.47 (L) 4.50 - 5.90 x10*6/uL Final   08/17/2023 4.60 4.50 - 5.90 x10E12/L Final   09/21/2022 4.78 4.50 - 5.90 x10E12/L Final   06/30/2021 5.39 4.50 - 5.90 x10E12/L Final     Hemoglobin   Date Value Ref Range Status   05/02/2024 12.2 (L) 13.5 - 17.5 g/dL Final   08/17/2023 12.7 (L) 13.5 - 17.5 g/dL Final   09/21/2022 13.4 (L) 13.5 - 17.5 g/dL Final   06/30/2021 15.6 13.5 - 17.5 g/dL Final     Hematocrit   Date Value Ref Range Status   05/02/2024 38.9 (L) 41.0 - 52.0 % Final   08/17/2023 40.6 (L) 41.0 - 52.0 % Final   09/21/2022 43.0 41.0 - 52.0 % Final   06/30/2021 48.9 41.0 - 52.0 % Final     MCV   Date/Time Value Ref Range Status   05/02/2024 09:54 AM 87 80 - 100 fL Final   08/17/2023 01:22 PM 88 80 - 100 fL Final  "  09/21/2022 03:11 PM 90 80 - 100 fL Final   06/30/2021 03:01 PM 91 80 - 100 fL Final     MCH   Date/Time Value Ref Range Status   05/02/2024 09:54 AM 27.3 26.0 - 34.0 pg Final     MCHC   Date/Time Value Ref Range Status   05/02/2024 09:54 AM 31.4 (L) 32.0 - 36.0 g/dL Final   08/17/2023 01:22 PM 31.3 (L) 32.0 - 36.0 g/dL Final   09/21/2022 03:11 PM 31.2 (L) 32.0 - 36.0 g/dL Final   06/30/2021 03:01 PM 31.9 (L) 32.0 - 36.0 g/dL Final     RDW   Date/Time Value Ref Range Status   05/02/2024 09:54 AM 14.3 11.5 - 14.5 % Final   08/17/2023 01:22 PM 13.7 11.5 - 14.5 % Final   09/21/2022 03:11 PM 14.4 11.5 - 14.5 % Final   06/30/2021 03:01 PM 14.3 11.5 - 14.5 % Final     Platelets   Date/Time Value Ref Range Status   05/02/2024 09:54  150 - 450 x10*3/uL Final   08/17/2023 01:22  150 - 450 x10E9/L Final   09/21/2022 03:11  150 - 450 x10E9/L Final   06/30/2021 03:01  150 - 450 x10E9/L Final     No results found for: \"MPV\"  Neutrophils %   Date/Time Value Ref Range Status   05/02/2024 09:54 AM 67.7 40.0 - 80.0 % Final   08/17/2023 01:22 PM 63.3 40.0 - 80.0 % Final   06/30/2021 03:01 PM 64.2 40.0 - 80.0 % Final   02/11/2020 03:31 PM 63.9 40.0 - 80.0 % Final     Immature Granulocytes %, Automated   Date/Time Value Ref Range Status   05/02/2024 09:54 AM 0.3 0.0 - 0.9 % Final     Comment:     Immature Granulocyte Count (IG) includes promyelocytes, myelocytes and metamyelocytes but does not include bands. Percent differential counts (%) should be interpreted in the context of the absolute cell counts (cells/UL).   08/17/2023 01:22 PM 0.3 0.0 - 0.9 % Final     Comment:      Immature Granulocyte Count (IG) includes promyelocytes,    myelocytes and metamyelocytes but does not include bands.   Percent differential counts (%) should be interpreted in the   context of the absolute cell counts (cells/L).   06/30/2021 03:01 PM 0.5 0.0 - 0.9 % Final     Comment:      Immature Granulocyte Count (IG) includes " promyelocytes,    myelocytes and metamyelocytes but does not include bands.   Percent differential counts (%) should be interpreted in the   context of the absolute cell counts (cells/L).     02/11/2020 03:31 PM 0.4 0.0 - 0.9 % Final     Comment:      Percent differential counts (%) should be interpreted in the   context of the absolute cell counts (cells/L).       Lymphocytes %   Date/Time Value Ref Range Status   05/02/2024 09:54 AM 18.0 13.0 - 44.0 % Final   08/17/2023 01:22 PM 25.2 13.0 - 44.0 % Final   06/30/2021 03:01 PM 25.1 13.0 - 44.0 % Final   02/11/2020 03:31 PM 24.7 13.0 - 44.0 % Final     Monocytes %   Date/Time Value Ref Range Status   05/02/2024 09:54 AM 8.9 2.0 - 10.0 % Final   08/17/2023 01:22 PM 8.5 2.0 - 10.0 % Final   06/30/2021 03:01 PM 7.6 2.0 - 10.0 % Final   02/11/2020 03:31 PM 7.9 2.0 - 10.0 % Final     Eosinophils %   Date/Time Value Ref Range Status   05/02/2024 09:54 AM 4.3 0.0 - 6.0 % Final   08/17/2023 01:22 PM 1.9 0.0 - 6.0 % Final   06/30/2021 03:01 PM 1.8 0.0 - 6.0 % Final   02/11/2020 03:31 PM 2.1 0.0 - 6.0 % Final     Basophils %   Date/Time Value Ref Range Status   05/02/2024 09:54 AM 0.8 0.0 - 2.0 % Final   08/17/2023 01:22 PM 0.8 0.0 - 2.0 % Final   06/30/2021 03:01 PM 0.8 0.0 - 2.0 % Final   02/11/2020 03:31 PM 1.0 0.0 - 2.0 % Final     Neutrophils Absolute   Date/Time Value Ref Range Status   05/02/2024 09:54 AM 7.36 1.20 - 7.70 x10*3/uL Final     Comment:     Percent differential counts (%) should be interpreted in the context of the absolute cell counts (cells/uL).   08/17/2023 01:22 PM 7.08 1.20 - 7.70 x10E9/L Final   06/30/2021 03:01 PM 6.81 1.20 - 7.70 x10E9/L Final   02/11/2020 03:31 PM 7.32 1.20 - 7.70 x10E9/L Final     Immature Granulocytes Absolute, Automated   Date/Time Value Ref Range Status   05/02/2024 09:54 AM 0.03 0.00 - 0.70 x10*3/uL Final     Lymphocytes Absolute   Date/Time Value Ref Range Status   05/02/2024 09:54 AM 1.96 1.20 - 4.80 x10*3/uL Final    08/17/2023 01:22 PM 2.81 1.20 - 4.80 x10E9/L Final   06/30/2021 03:01 PM 2.66 1.20 - 4.80 x10E9/L Final   02/11/2020 03:31 PM 2.83 1.20 - 4.80 x10E9/L Final     Monocytes Absolute   Date/Time Value Ref Range Status   05/02/2024 09:54 AM 0.97 0.10 - 1.00 x10*3/uL Final   08/17/2023 01:22 PM 0.95 0.10 - 1.00 x10E9/L Final   06/30/2021 03:01 PM 0.80 0.10 - 1.00 x10E9/L Final   02/11/2020 03:31 PM 0.90 0.10 - 1.00 x10E9/L Final     Eosinophils Absolute   Date/Time Value Ref Range Status   05/02/2024 09:54 AM 0.47 0.00 - 0.70 x10*3/uL Final   08/17/2023 01:22 PM 0.21 0.00 - 0.70 x10E9/L Final   06/30/2021 03:01 PM 0.19 0.00 - 0.70 x10E9/L Final   02/11/2020 03:31 PM 0.24 0.00 - 0.70 x10E9/L Final     Basophils Absolute   Date/Time Value Ref Range Status   05/02/2024 09:54 AM 0.09 0.00 - 0.10 x10*3/uL Final   08/17/2023 01:22 PM 0.09 0.00 - 0.10 x10E9/L Final   06/30/2021 03:01 PM 0.08 0.00 - 0.10 x10E9/L Final   02/11/2020 03:31 PM 0.12 (H) 0.00 - 0.10 x10E9/L Final       Assessment/Plan    69 yo  man with a PMH of DM, CKD, elevated LFT's former 50 pack year smoker, AAA, HTN, hyperlipidemia, carotid stenosis, PVD, pulmonary HTN,  referred to benign hematology for anemia consult by Dr. Hoffman.     Review of labs note a mild normocytic anemia. Last labs were drawn in August 2023. Hgb 12.7, MCV 88, remainder of CBC/diff WNL at that time. Last GFR 30, creatinine 2.33, hgb A1C 7.9.     Discussed possible etiologies including multifactorial, nutritional deficiencies, anemia of chronic disease, malabsorption, hemopathy, medications, bleeding, malignancy, etc. Our workup showed a mild normocytic anemia most likely secondary to CKD. SFLC ratio elevated most likely due to CKD. No monoclonal protein noted. Adequate iron and B12, however low folic acid at 4.8. Discussed folic acid replacement today and patient will  the prescription and start 1 mg po daily. At some point may need EPO support but with hgb of  12.2 this is not needed at this time.   Plan:  Start folic acid 1mg po daily. Prescrition sent to his local pharmacy.   CBC/diff, iron studies, B12, Folate and CMP in 3 months and again in 6 months with exam.   Follow up with PCP for CKD and other issues.     I had an extensive discussion with the patient regarding the diagnosis and discussed the plan of therapy, including general considerations regarding side effects and outcomes. Pt understood and gave appropriate teach back about the plan of care. All questions were answered to the patient's satisfaction. The patient is instructed to contact us at any time if questions or problems arise. Thank you for the opportunity to participate in the care of this very pleasant patient.     Rosanne M Casal, APRN-CNP, DNP

## 2024-05-14 ENCOUNTER — PHARMACY VISIT (OUTPATIENT)
Dept: PHARMACY | Facility: CLINIC | Age: 69
End: 2024-05-14
Payer: COMMERCIAL

## 2024-05-14 PROCEDURE — RXMED WILLOW AMBULATORY MEDICATION CHARGE

## 2024-06-10 ENCOUNTER — SPECIALTY PHARMACY (OUTPATIENT)
Dept: PHARMACY | Facility: CLINIC | Age: 69
End: 2024-06-10

## 2024-06-10 DIAGNOSIS — K86.89 SECONDARY PANCREATIC INSUFFICIENCY (HHS-HCC): Primary | ICD-10-CM

## 2024-06-11 RX ORDER — PANCRELIPASE 24000; 76000; 120000 [USP'U]/1; [USP'U]/1; [USP'U]/1
CAPSULE, DELAYED RELEASE PELLETS ORAL
Qty: 1440 CAPSULE | Refills: 2 | Status: SHIPPED | OUTPATIENT
Start: 2024-06-11 | End: 2025-06-11

## 2024-06-12 PROCEDURE — RXMED WILLOW AMBULATORY MEDICATION CHARGE

## 2024-06-18 ENCOUNTER — PHARMACY VISIT (OUTPATIENT)
Dept: PHARMACY | Facility: CLINIC | Age: 69
End: 2024-06-18
Payer: COMMERCIAL

## 2024-07-03 ENCOUNTER — APPOINTMENT (OUTPATIENT)
Dept: OPHTHALMOLOGY | Facility: CLINIC | Age: 69
End: 2024-07-03
Payer: MEDICARE

## 2024-07-03 DIAGNOSIS — H52.4 PRESBYOPIA: ICD-10-CM

## 2024-07-03 DIAGNOSIS — H25.13 CATARACT, NUCLEAR SCLEROTIC, BOTH EYES: Primary | ICD-10-CM

## 2024-07-03 DIAGNOSIS — H35.3121 EARLY DRY STAGE NONEXUDATIVE AGE-RELATED MACULAR DEGENERATION OF LEFT EYE: ICD-10-CM

## 2024-07-03 DIAGNOSIS — E11.59 TYPE 2 DIABETES MELLITUS WITH OTHER CIRCULATORY COMPLICATION, WITH LONG-TERM CURRENT USE OF INSULIN (MULTI): ICD-10-CM

## 2024-07-03 DIAGNOSIS — H52.03 HYPEROPIA OF BOTH EYES: ICD-10-CM

## 2024-07-03 DIAGNOSIS — Z79.4 TYPE 2 DIABETES MELLITUS WITH OTHER CIRCULATORY COMPLICATION, WITH LONG-TERM CURRENT USE OF INSULIN (MULTI): ICD-10-CM

## 2024-07-03 DIAGNOSIS — H52.223 REGULAR ASTIGMATISM OF BOTH EYES: ICD-10-CM

## 2024-07-03 PROCEDURE — 92014 COMPRE OPH EXAM EST PT 1/>: CPT | Performed by: OPHTHALMOLOGY

## 2024-07-03 PROCEDURE — 92015 DETERMINE REFRACTIVE STATE: CPT | Performed by: OPHTHALMOLOGY

## 2024-07-03 ASSESSMENT — REFRACTION_MANIFEST
OS_AXIS: 080
OD_AXIS: 065
OS_CYLINDER: -1.50
OS_SPHERE: +1.00
OS_ADD: +2.50
OD_SPHERE: +1.00
OD_ADD: +2.50
OD_CYLINDER: -0.50

## 2024-07-03 ASSESSMENT — REFRACTION_WEARINGRX
OD_ADD: +2.50
OD_SPHERE: +0.25
OD_AXIS: 065
OS_ADD: +2.50
OD_CYLINDER: -0.50
OS_SPHERE: +1.00
OS_AXIS: 080
OS_CYLINDER: -1.25

## 2024-07-03 ASSESSMENT — CUP TO DISC RATIO
OD_RATIO: .3
OS_RATIO: .3

## 2024-07-03 ASSESSMENT — EXTERNAL EXAM - LEFT EYE: OS_EXAM: NORMAL

## 2024-07-03 ASSESSMENT — TONOMETRY
IOP_METHOD: GOLDMANN APPLANATION
OD_IOP_MMHG: 15
OS_IOP_MMHG: 16

## 2024-07-03 ASSESSMENT — EXTERNAL EXAM - RIGHT EYE: OD_EXAM: NORMAL

## 2024-07-03 ASSESSMENT — VISUAL ACUITY
OS_CC: 20/30+1
METHOD: SNELLEN - LINEAR
OD_CC: 20/25

## 2024-07-03 ASSESSMENT — SLIT LAMP EXAM - LIDS
COMMENTS: GOOD POSITION
COMMENTS: GOOD POSITION

## 2024-07-03 NOTE — PROGRESS NOTES
Assessment/Plan   Diagnoses and all orders for this visit:  Cataract, nuclear sclerotic, both eyes  Not visually significant at the present time  continue to monitor    Type 2 diabetes mellitus with other circulatory complication, with long-term current use of insulin (Multi)  -no evidence of diabetic retinopathy at the present time  -pt was advised of the importance of good diabetes control and the importance of a yearly dilated diabetic exam    Early dry stage nonexudative age-related macular degeneration of left eye  continue to monitor    Hyperopia of both eyes  Regular astigmatism of both eyes  Presbyopia  Refractive error  -give Rx for new glasses    Return for a dilated exam in  12  months or sooner if having any problems

## 2024-07-15 ENCOUNTER — SPECIALTY PHARMACY (OUTPATIENT)
Dept: PHARMACY | Facility: CLINIC | Age: 69
End: 2024-07-15

## 2024-07-15 PROCEDURE — RXMED WILLOW AMBULATORY MEDICATION CHARGE

## 2024-07-18 ENCOUNTER — PHARMACY VISIT (OUTPATIENT)
Dept: PHARMACY | Facility: CLINIC | Age: 69
End: 2024-07-18
Payer: COMMERCIAL

## 2024-08-15 DIAGNOSIS — E78.5 HYPERLIPIDEMIA, UNSPECIFIED HYPERLIPIDEMIA TYPE: ICD-10-CM

## 2024-08-16 ENCOUNTER — SPECIALTY PHARMACY (OUTPATIENT)
Dept: PHARMACY | Facility: CLINIC | Age: 69
End: 2024-08-16

## 2024-08-16 DIAGNOSIS — E11.9 TYPE 2 DIABETES MELLITUS WITHOUT COMPLICATION, WITHOUT LONG-TERM CURRENT USE OF INSULIN (MULTI): ICD-10-CM

## 2024-08-16 DIAGNOSIS — E78.5 HYPERLIPIDEMIA, UNSPECIFIED HYPERLIPIDEMIA TYPE: ICD-10-CM

## 2024-08-16 PROCEDURE — RXMED WILLOW AMBULATORY MEDICATION CHARGE

## 2024-08-20 ENCOUNTER — PHARMACY VISIT (OUTPATIENT)
Dept: PHARMACY | Facility: CLINIC | Age: 69
End: 2024-08-20
Payer: COMMERCIAL

## 2024-08-21 ENCOUNTER — APPOINTMENT (OUTPATIENT)
Dept: PRIMARY CARE | Facility: CLINIC | Age: 69
End: 2024-08-21
Payer: MEDICARE

## 2024-08-21 ENCOUNTER — LAB (OUTPATIENT)
Dept: LAB | Facility: LAB | Age: 69
End: 2024-08-21
Payer: MEDICARE

## 2024-08-21 VITALS — DIASTOLIC BLOOD PRESSURE: 78 MMHG | SYSTOLIC BLOOD PRESSURE: 158 MMHG | WEIGHT: 152 LBS | BODY MASS INDEX: 22.45 KG/M2

## 2024-08-21 DIAGNOSIS — D50.8 OTHER IRON DEFICIENCY ANEMIA: ICD-10-CM

## 2024-08-21 DIAGNOSIS — Z91.89 AT RISK FOR IMPAIRED HEALTH MAINTENANCE: ICD-10-CM

## 2024-08-21 DIAGNOSIS — E78.5 HYPERLIPIDEMIA, UNSPECIFIED HYPERLIPIDEMIA TYPE: ICD-10-CM

## 2024-08-21 DIAGNOSIS — Z00.00 HEALTHCARE MAINTENANCE: Primary | ICD-10-CM

## 2024-08-21 DIAGNOSIS — N18.4 CHRONIC RENAL DISEASE, STAGE IV (MULTI): ICD-10-CM

## 2024-08-21 DIAGNOSIS — Z00.00 HEALTHCARE MAINTENANCE: ICD-10-CM

## 2024-08-21 DIAGNOSIS — N18.32 STAGE 3B CHRONIC KIDNEY DISEASE (MULTI): ICD-10-CM

## 2024-08-21 DIAGNOSIS — I10 HYPERTENSION, UNSPECIFIED TYPE: ICD-10-CM

## 2024-08-21 DIAGNOSIS — E11.9 TYPE 2 DIABETES MELLITUS WITHOUT COMPLICATION, WITHOUT LONG-TERM CURRENT USE OF INSULIN (MULTI): ICD-10-CM

## 2024-08-21 LAB
ALBUMIN SERPL BCP-MCNC: 3.5 G/DL (ref 3.4–5)
ALP SERPL-CCNC: 121 U/L (ref 33–136)
ALT SERPL W P-5'-P-CCNC: 47 U/L (ref 10–52)
ANION GAP SERPL CALC-SCNC: 15 MMOL/L (ref 10–20)
AST SERPL W P-5'-P-CCNC: 39 U/L (ref 9–39)
BASOPHILS # BLD AUTO: 0.11 X10*3/UL (ref 0–0.1)
BASOPHILS NFR BLD AUTO: 0.9 %
BILIRUB SERPL-MCNC: 0.3 MG/DL (ref 0–1.2)
BUN SERPL-MCNC: 52 MG/DL (ref 6–23)
CALCIUM SERPL-MCNC: 8.4 MG/DL (ref 8.6–10.6)
CHLORIDE SERPL-SCNC: 110 MMOL/L (ref 98–107)
CHOLEST SERPL-MCNC: 114 MG/DL (ref 0–199)
CHOLESTEROL/HDL RATIO: 3.2
CO2 SERPL-SCNC: 17 MMOL/L (ref 21–32)
CREAT SERPL-MCNC: 3.18 MG/DL (ref 0.5–1.3)
EGFRCR SERPLBLD CKD-EPI 2021: 20 ML/MIN/1.73M*2
EOSINOPHIL # BLD AUTO: 0.4 X10*3/UL (ref 0–0.7)
EOSINOPHIL NFR BLD AUTO: 3.1 %
ERYTHROCYTE [DISTWIDTH] IN BLOOD BY AUTOMATED COUNT: 14.2 % (ref 11.5–14.5)
EST. AVERAGE GLUCOSE BLD GHB EST-MCNC: 157 MG/DL
GLUCOSE SERPL-MCNC: 192 MG/DL (ref 74–99)
HBA1C MFR BLD: 7.1 %
HCT VFR BLD AUTO: 36.3 % (ref 41–52)
HDLC SERPL-MCNC: 35.5 MG/DL
HGB BLD-MCNC: 11.3 G/DL (ref 13.5–17.5)
IMM GRANULOCYTES # BLD AUTO: 0.07 X10*3/UL (ref 0–0.7)
IMM GRANULOCYTES NFR BLD AUTO: 0.5 % (ref 0–0.9)
LDLC SERPL CALC-MCNC: 64 MG/DL
LYMPHOCYTES # BLD AUTO: 2.4 X10*3/UL (ref 1.2–4.8)
LYMPHOCYTES NFR BLD AUTO: 18.8 %
MCH RBC QN AUTO: 27 PG (ref 26–34)
MCHC RBC AUTO-ENTMCNC: 31.1 G/DL (ref 32–36)
MCV RBC AUTO: 87 FL (ref 80–100)
MONOCYTES # BLD AUTO: 1.27 X10*3/UL (ref 0.1–1)
MONOCYTES NFR BLD AUTO: 10 %
NEUTROPHILS # BLD AUTO: 8.49 X10*3/UL (ref 1.2–7.7)
NEUTROPHILS NFR BLD AUTO: 66.7 %
NON HDL CHOLESTEROL: 79 MG/DL (ref 0–149)
NRBC BLD-RTO: 0 /100 WBCS (ref 0–0)
PLATELET # BLD AUTO: 261 X10*3/UL (ref 150–450)
POTASSIUM SERPL-SCNC: 5 MMOL/L (ref 3.5–5.3)
PROT SERPL-MCNC: 6.9 G/DL (ref 6.4–8.2)
PSA SERPL-MCNC: 0.41 NG/ML
RBC # BLD AUTO: 4.19 X10*6/UL (ref 4.5–5.9)
SODIUM SERPL-SCNC: 137 MMOL/L (ref 136–145)
T4 FREE SERPL-MCNC: 0.95 NG/DL (ref 0.78–1.48)
TRIGL SERPL-MCNC: 74 MG/DL (ref 0–149)
TSH SERPL-ACNC: 1.11 MIU/L (ref 0.44–3.98)
VLDL: 15 MG/DL (ref 0–40)
WBC # BLD AUTO: 12.7 X10*3/UL (ref 4.4–11.3)

## 2024-08-21 PROCEDURE — 85025 COMPLETE CBC W/AUTO DIFF WBC: CPT

## 2024-08-21 PROCEDURE — 84153 ASSAY OF PSA TOTAL: CPT

## 2024-08-21 PROCEDURE — 1123F ACP DISCUSS/DSCN MKR DOCD: CPT | Performed by: INTERNAL MEDICINE

## 2024-08-21 PROCEDURE — 80053 COMPREHEN METABOLIC PANEL: CPT

## 2024-08-21 PROCEDURE — 1158F ADVNC CARE PLAN TLK DOCD: CPT | Performed by: INTERNAL MEDICINE

## 2024-08-21 PROCEDURE — 3077F SYST BP >= 140 MM HG: CPT | Performed by: INTERNAL MEDICINE

## 2024-08-21 PROCEDURE — 83036 HEMOGLOBIN GLYCOSYLATED A1C: CPT

## 2024-08-21 PROCEDURE — 84443 ASSAY THYROID STIM HORMONE: CPT

## 2024-08-21 PROCEDURE — 36415 COLL VENOUS BLD VENIPUNCTURE: CPT

## 2024-08-21 PROCEDURE — 3051F HG A1C>EQUAL 7.0%<8.0%: CPT | Performed by: INTERNAL MEDICINE

## 2024-08-21 PROCEDURE — 99214 OFFICE O/P EST MOD 30 MIN: CPT | Performed by: INTERNAL MEDICINE

## 2024-08-21 PROCEDURE — 80061 LIPID PANEL: CPT

## 2024-08-21 PROCEDURE — 3078F DIAST BP <80 MM HG: CPT | Performed by: INTERNAL MEDICINE

## 2024-08-21 PROCEDURE — 84439 ASSAY OF FREE THYROXINE: CPT

## 2024-08-21 PROCEDURE — 1036F TOBACCO NON-USER: CPT | Performed by: INTERNAL MEDICINE

## 2024-08-21 RX ORDER — ATORVASTATIN CALCIUM 40 MG/1
40 TABLET, FILM COATED ORAL DAILY
Qty: 90 TABLET | Refills: 1 | Status: CANCELLED | OUTPATIENT
Start: 2024-08-21

## 2024-08-21 RX ORDER — HYDRALAZINE HYDROCHLORIDE 10 MG/1
10 TABLET, FILM COATED ORAL EVERY 12 HOURS
Qty: 180 TABLET | Refills: 1 | Status: SHIPPED | OUTPATIENT
Start: 2024-08-21

## 2024-08-21 RX ORDER — METOPROLOL SUCCINATE 50 MG/1
50 TABLET, EXTENDED RELEASE ORAL DAILY
Qty: 90 TABLET | Refills: 1 | Status: SHIPPED | OUTPATIENT
Start: 2024-08-21

## 2024-08-21 RX ORDER — FOLIC ACID 1 MG/1
1 TABLET ORAL DAILY
Qty: 30 TABLET | Refills: 6 | Status: SHIPPED | OUTPATIENT
Start: 2024-08-21 | End: 2025-08-21

## 2024-08-21 RX ORDER — ATORVASTATIN CALCIUM 40 MG/1
40 TABLET, FILM COATED ORAL DAILY
Qty: 90 TABLET | Refills: 0 | OUTPATIENT
Start: 2024-08-21

## 2024-08-21 RX ORDER — ATORVASTATIN CALCIUM 40 MG/1
40 TABLET, FILM COATED ORAL DAILY
Qty: 90 TABLET | Refills: 1 | Status: SHIPPED | OUTPATIENT
Start: 2024-08-21

## 2024-08-21 RX ORDER — INSULIN GLARGINE 100 [IU]/ML
INJECTION, SOLUTION SUBCUTANEOUS
Qty: 15 ML | Refills: 2 | Status: SHIPPED | OUTPATIENT
Start: 2024-08-21

## 2024-08-21 RX ORDER — AMLODIPINE BESYLATE 10 MG/1
10 TABLET ORAL DAILY
Qty: 90 TABLET | Refills: 1 | Status: SHIPPED | OUTPATIENT
Start: 2024-08-21

## 2024-08-21 NOTE — PROGRESS NOTES
Subjective   Patient ID: Flip Murphy is a 68 y.o. male who presents for Follow-up and Med Refill.  HPI        Chief Complaint:   HPI:   with a medical history of DM2, HTN, HLD, IBS reported pancreatic insufficiency, and AAA presenting to the office for an annual check up.   Reported ER presentation [place] November 2022 pancreatitis severe hyperglycemia    Patient overall doing well aside from unexplained weight loss over the last year he is lost 10 pounds nothing makes better or worse  Positive tobacco history and abuse history  Unexplained weight loss denies trying to      Home glucose reportedly 1 20-1 80    Active Problem List      Comprehensive Medical/Surgical/Social/Family History  Past Medical History:   Diagnosis Date    Abdominal distension (gaseous) 07/16/2019    Bloating    Change in bowel habit 07/16/2019    Change in bowel habits    Elevated plasma metanephrines 05/19/2016    Encounter for other preprocedural examination     Pre-op testing    Generalized abdominal pain 07/05/2019    Generalized abdominal pain    Hypertensive urgency 11/26/2022    Irritable bowel syndrome with constipation 07/02/2019    Irritable bowel syndrome with constipation    Leukocytosis 08/16/2023    Other fecal abnormalities 06/27/2019    Change in stool    Other specified diseases of intestine 09/05/2019    Small intestinal bacterial overgrowth    Other specified postprocedural states     H/O colonoscopy    Personal history of other diseases of the digestive system 05/31/2019    History of chronic constipation    S/P aortobifemoral bypass surgery 03/03/2016    Secondary diabetes (Multi) 08/16/2023    Small intestinal bacterial overgrowth 08/16/2023    Tobacco abuse counseling 02/27/2019    Tobacco abuse counseling    Tobacco use 05/06/2020    Continuous tobacco abuse     Past Surgical History:   Procedure Laterality Date    CT ANGIO NECK  3/24/2017    CT NECK ANGIO W AND WO IV CONTRAST 3/24/2017 [facility]    OTHER  "SURGICAL HISTORY  07/05/2019    Abdominal Aortic Aneurysm Repair For Dilation Or Occlusion     Social History     Social History Narrative    Not on file         Allergies and Medications  Patient has no known allergies.  Current Outpatient Medications on File Prior to Visit   Medication Sig Dispense Refill    ammonium lactate (Amlactin) 12 % cream Apply topically if needed for dry skin. 60 g 2    blood sugar diagnostic (Contour Next Test Strips) strip test once daily 100 strip 1    clopidogrel (Plavix) 75 mg tablet Take 1 tablet (75 mg) by mouth once daily. 90 tablet 1    Creon 24,000-76,000 -120,000 unit capsule Take by mouth.      insulin glargine (Lantus Solostar U-100 Insulin) 100 unit/mL (3 mL) pen Inject 20 Units under the skin once daily at bedtime. 15 mL 1    lipase-protease-amylase (Creon) 24,000-76,000 -120,000 unit capsule TAKE UP TO 3 CAPSULES BY MOUTH THREE TIMES A DAY WITH MEALS AND 2 CAPSULES WITH SNACKS. 1440 capsule 2    pen needle, diabetic (BD Ultra-Fine Mini Pen Needle) 31 gauge x 3/16\" needle USE DAILY FOR INSULIN INJECTIONS 100 each 1    tadalafil (Cialis) 10 mg tablet Take 1 tablet (10 mg) by mouth once daily as needed for erectile dysfunction. 12 tablet 2    [DISCONTINUED] amLODIPine (Norvasc) 10 mg tablet Take 1 tablet (10 mg) by mouth once daily. 90 tablet 1    [DISCONTINUED] atorvastatin (Lipitor) 40 mg tablet Take 1 tablet (40 mg) by mouth once daily. 90 tablet 1    [DISCONTINUED] folic acid (Folvite) 1 mg tablet Take 1 tablet (1 mg) by mouth once daily. 30 tablet 6    [DISCONTINUED] hydrALAZINE (Apresoline) 10 mg tablet TAKE ONE TABLET BY MOUTH EVERY 12 HOURS 180 tablet 1    [DISCONTINUED] Januvia 50 mg tablet TAKE ONE TABLET BY MOUTH DAILY 90 tablet 1    [DISCONTINUED] metoprolol succinate XL (Toprol-XL) 50 mg 24 hr tablet Take 1 tablet (50 mg) by mouth once daily. 90 tablet 1     No current facility-administered medications on file prior to visit.       Medications and Supplements  " "prescribed by me and other practitioners or clinical pharmacist (such as prescriptions, OTC's, herbal therapies and supplements) were reviewed and documented in the medical record.    Tobacco/Alcohol/Opioid use, as well as Illicit Drug Use was screened for/reviewed and documented in Social History section and medication list as appropriate  Activities of Daily Living  In your present state of health, do you have any difficulty performing the following activities?:   Preparing food and eating?: No  Bathing yourself: No  Getting dressed: No  Using the toilet:No  Moving around from place to place: No  In the past year have you fallen or had a near fall?:No    Depression Screen  (Note: if answer to either of the following is \"Yes\", then a more complete depression screening is indicated)   Q1: Over the past two weeks, have you felt down, depressed or hopeless? No  Q2: Over the past two weeks, have you felt little interest or pleasure in doing things? No    Current exercise habits: The patient does not participate in regular exercise at present.   Dietary issues discussed: Yes  Hearing difficulties: no  Safe in current home environment: yes  Visual Acuity assessed: no  Cognitive Impairment assessed: no       Advance directives  Advanced Care Planning (including a Living Will, Healthcare POA, as well as specific end of life choices and/or directives), was discussed for approximately 16 minutes with the patient and/or surrogate, voluntarily, and documented in the medical record.     Cardiac Risk Assessment  Cardiovascular risk was discussed and, if needed, lifestyle modifications recommended, including nutritional choices, exercise, and elimination of habits contributing to risk. We agreed on a plan to reduce the current cardiovascular risk based on above discussion as needed.  Aspirin use/disuse was discussed after reviewing the updated guidelines below:    Consider low dose Aspirin ( mg) use if the benefit for " cardiovascular disease prevention outweighs risk for bleeding complications.   In general, low dose ASA should be considered:  In patients WITHOUT prior MI/stroke/PAD (primary prevention):   a. Age <60: Use if 10-year cardiovascular disease risk >20%, with discussion of risks and benefits with patient  b. Age 60-<70: Use if 10-year cardiovascular disease risk >20% and low bleeding (e.g., gastrointenstinal) risk, with discussion of risks and benefits with patient  c. Age >=70: Do not use    In patients WITH prior MI/stroke/PAD (secondary prevention):   Generally use unless extremely high bleeding (e.g., gastrointenstinal) risk, with discussion of risks and benefits with patient    ROS otherwise negative aside from what was mentioned above in HPI.  Health Maintenance  Colonoscopy: 2023 due 2024  Mammogram: []  Pap smear/Pelvic Exam: []  DEXA: []  Low dose chest CT: []   Screening Abdominal US: []  Opthalmology: []  Podiatry: []    Immunizations  Influenza: []  Pneumovax: []  Prevnar 13: []  Td/Tdap: []  Zostavax: []            ROS:  Constitutional: Unexplained weight loss [] denies fever/night sweats/weight loss/fatigue/insomnia  Head: [] denies trauma/headache/masses  Eyes: [] denies loss of vision/blurry vision/diplopia/redness/eye pain  Ears: [] denies hearing loss/tinnitus/masses/pain/otorrhea  Nose: [] denies anosmia/masses/epistaxis/drainage  Throat: [] denies dysphagia/odynophagia  Neck: [] denies masses/asymmetry  Lymphatics: [] denies lymph node swelling  Cardiovascular: [] denies CP/orthopnea/PND/leg edema/palpitations  Pulmonary: [] denies SOB/dyspnea with exertion/cough/sputum production/hemoptysis/wheezing  GI: [] denies abdominal pain/nausea/vomiting/dysphagia/odynophagia/melena/hematochezia/diarrhea/constipation/change in stool caliber/bowel incontinence  : [] denies dysuria/hematuria/increased frequency/nocturia/dribbling/urinary incontinence  Genital: [] Erectile dysfunction better with Cialis  denies discharge/masses/lesions  Musculoskeletal: [] denies trauma/arthralgia/myalgia/deformity/joint swelling  Hematologic: [] denies easy bruising or bleeding  Neurologic: [] denies gait imbalance/paresthesias/saddle paresthesia/focal weakness/dysarthria/seizure  Skin: [] denies masses/lesions/rashes/tattoos  Psychiatric: [] denies depression/suicidal or homicidal thoughts    Vitals  Vitals:    08/21/24 1111   BP: 158/78     Body mass index is 22.45 kg/m².  Physical Exam  Gen: Alert, NAD  HEENT:  PERRLA, EOMI, conjunctiva and sclera normal in appearance. External auditory canals/TMs normal; Oral cavity and posterior pharynx without lesions/exudate  Neck:  Supple with FROM; No masses/nodes palpable; Thyroid nontender and without nodules; No MARICARMEN  Respiratory:  Lungs CTAB  Cardiovascular:  Heart RRR. No M/R/G. Peripheral pulses equal bilaterally  Abdomen:  Soft, nontender, BS present throughout; No R/G/R; No HSM or masses palpated  Extremities: 2+ palpable DP PT bilateral lower extremities FROM all extremities; Muscle strength grossly normal with good tone  Neuro:  CN II-XII intact; Reflexes 2+/2+; Gross motor and sensory intact  Skin: no suspicious lesions present or rashes  Breast: No masses, skin lesions or nipple discharges, no axillary lymphadenopathy      Patient states his glucose is labile sometimes gets low readings under 70 sometimes very high readings therefore I advised him to follow-up with endocrinology given hypoglycemia intermixed    Patient refused low-dose chest CT of the lungs advised her the chance of lung cancer he understands the risk  Assessment and Plan:  Problem List Items Addressed This Visit       CKD (chronic kidney disease)    Relevant Medications    folic acid (Folvite) 1 mg tablet    Type 2 diabetes mellitus (Multi)    Relevant Medications    SITagliptin phosphate (Januvia) 50 mg tablet    Hyperlipidemia    Relevant Medications    atorvastatin (Lipitor) 40 mg tablet    Chronic renal  "disease, stage IV (Multi)    Current Assessment & Plan     Tract BMP avoid nephrotoxins          Other Visit Diagnoses       Healthcare maintenance    -  Primary    Relevant Orders    CBC and Auto Differential    Comprehensive Metabolic Panel    Hemoglobin A1C    Lipid Panel    Thyroxine, Free    Thyroid Stimulating Hormone    Prostate Specific Antigen, Screen    Hypertension, unspecified type        Relevant Medications    amLODIPine (Norvasc) 10 mg tablet    metoprolol succinate XL (Toprol-XL) 50 mg 24 hr tablet    hydrALAZINE (Apresoline) 10 mg tablet    Other iron deficiency anemia        Relevant Medications    folic acid (Folvite) 1 mg tablet    At risk for impaired health maintenance        Relevant Orders    CBC and Auto Differential          Tobacco cessation is prudent        Call follow-up with hematology recommendations noted folate 1 mg a day    Call and follow-up with cardiology as planned follow-up in 6 months    Ophthalmology recommendations noted follow-up in 12 months    Follow-up with nephrology call    Call and follow-up with colonoscopy as ordered as he stated in September        Thank you for making appointment today Flip    Please follow-up 6 months    Tim Hoffman DO, FACTRICIA        During the course of the visit the patient was educated and counseled about age appropriate screening and preventive services. Completed preventive screenings were documented in the chart and orders were placed for outstanding screenings/procedures as documented in the Assessment and Plan.      Patient Instructions (the written plan) was given to the patient at check out.      Tim Hoffman DO        Health Maintenance:      Colonoscopy:      Mammogram:      Pelvic/Pap:      Low dose chest CT:      Aorta duplex:      Optho:      Podiatry:        Vaccines:      Refer to Epic Vaccination Log                    Objective   /78   Wt 68.9 kg (152 lb)   BMI 22.45 kg/m²      No results found for: \"BMPR1A\", " "\"CBCDIF\"      Assessment/Plan   Diagnoses and all orders for this visit:  Healthcare maintenance  -     CBC and Auto Differential; Future  -     Comprehensive Metabolic Panel; Future  -     Hemoglobin A1C; Future  -     Lipid Panel; Future  -     Thyroxine, Free; Future  -     Thyroid Stimulating Hormone; Future  -     Prostate Specific Antigen, Screen; Future  Hypertension, unspecified type  -     amLODIPine (Norvasc) 10 mg tablet; Take 1 tablet (10 mg) by mouth once daily.  -     metoprolol succinate XL (Toprol-XL) 50 mg 24 hr tablet; Take 1 tablet (50 mg) by mouth once daily.  -     hydrALAZINE (Apresoline) 10 mg tablet; Take 1 tablet (10 mg) by mouth every 12 hours.  Stage 3b chronic kidney disease (Multi)  -     folic acid (Folvite) 1 mg tablet; Take 1 tablet (1 mg) by mouth once daily.  Other iron deficiency anemia  -     folic acid (Folvite) 1 mg tablet; Take 1 tablet (1 mg) by mouth once daily.  Type 2 diabetes mellitus without complication, without long-term current use of insulin (Multi)  -     SITagliptin phosphate (Januvia) 50 mg tablet; Take 1 tablet (50 mg) by mouth once daily.  Hyperlipidemia, unspecified hyperlipidemia type  -     atorvastatin (Lipitor) 40 mg tablet; Take 1 tablet (40 mg) by mouth once daily.  At risk for impaired health maintenance  -     CBC and Auto Differential; Future  Chronic renal disease, stage IV (Multi)           Ulices Cabral up and med refill  "

## 2024-08-26 DIAGNOSIS — Z12.11 COLON CANCER SCREENING: Primary | ICD-10-CM

## 2024-08-26 RX ORDER — SODIUM, POTASSIUM,MAG SULFATES 17.5-3.13G
1 SOLUTION, RECONSTITUTED, ORAL ORAL EVERY 12 HOURS SCHEDULED
Qty: 354 ML | Refills: 0 | Status: SHIPPED | OUTPATIENT
Start: 2024-08-26

## 2024-08-29 DIAGNOSIS — E11.9 TYPE 2 DIABETES MELLITUS WITHOUT COMPLICATION, WITHOUT LONG-TERM CURRENT USE OF INSULIN (MULTI): ICD-10-CM

## 2024-08-29 RX ORDER — BLOOD SUGAR DIAGNOSTIC
STRIP MISCELLANEOUS
Qty: 100 STRIP | Refills: 1 | Status: SHIPPED | OUTPATIENT
Start: 2024-08-29

## 2024-08-29 RX ORDER — PEN NEEDLE, DIABETIC 30 GX3/16"
NEEDLE, DISPOSABLE MISCELLANEOUS
Qty: 100 EACH | Refills: 1 | Status: SHIPPED | OUTPATIENT
Start: 2024-08-29

## 2024-09-03 ENCOUNTER — HOSPITAL ENCOUNTER (OUTPATIENT)
Dept: GASTROENTEROLOGY | Facility: HOSPITAL | Age: 69
Discharge: HOME | End: 2024-09-03
Payer: MEDICARE

## 2024-09-03 VITALS
BODY MASS INDEX: 22.2 KG/M2 | HEART RATE: 62 BPM | HEIGHT: 69 IN | RESPIRATION RATE: 16 BRPM | SYSTOLIC BLOOD PRESSURE: 135 MMHG | DIASTOLIC BLOOD PRESSURE: 71 MMHG | TEMPERATURE: 97.7 F | WEIGHT: 149.91 LBS | OXYGEN SATURATION: 100 %

## 2024-09-03 DIAGNOSIS — Z12.11 COLON CANCER SCREENING: Primary | ICD-10-CM

## 2024-09-03 LAB — GLUCOSE BLD MANUAL STRIP-MCNC: 143 MG/DL (ref 74–99)

## 2024-09-03 PROCEDURE — G0500 MOD SEDAT ENDO SERVICE >5YRS: HCPCS | Performed by: INTERNAL MEDICINE

## 2024-09-03 PROCEDURE — 0753T DGTZ GLS MCRSCP SLD LEVEL IV: CPT | Mod: TC,AHULAB | Performed by: INTERNAL MEDICINE

## 2024-09-03 PROCEDURE — 2500000004 HC RX 250 GENERAL PHARMACY W/ HCPCS (ALT 636 FOR OP/ED): Performed by: INTERNAL MEDICINE

## 2024-09-03 PROCEDURE — 88305 TISSUE EXAM BY PATHOLOGIST: CPT | Performed by: PATHOLOGY

## 2024-09-03 PROCEDURE — 45385 COLONOSCOPY W/LESION REMOVAL: CPT | Performed by: INTERNAL MEDICINE

## 2024-09-03 PROCEDURE — 3700000013 HC SEDATION LEVEL 5+ TIME - EACH ADDITIONAL 15 MINUTES

## 2024-09-03 PROCEDURE — 7100000010 HC PHASE TWO TIME - EACH INCREMENTAL 1 MINUTE

## 2024-09-03 PROCEDURE — 82947 ASSAY GLUCOSE BLOOD QUANT: CPT

## 2024-09-03 PROCEDURE — 3700000012 HC SEDATION LEVEL 5+ TIME - INITIAL 15 MINUTES 5/> YEARS

## 2024-09-03 PROCEDURE — 7100000009 HC PHASE TWO TIME - INITIAL BASE CHARGE

## 2024-09-03 RX ORDER — MIDAZOLAM HYDROCHLORIDE 1 MG/ML
INJECTION INTRAMUSCULAR; INTRAVENOUS AS NEEDED
Status: COMPLETED | OUTPATIENT
Start: 2024-09-03 | End: 2024-09-03

## 2024-09-03 RX ORDER — SODIUM CHLORIDE, SODIUM LACTATE, POTASSIUM CHLORIDE, CALCIUM CHLORIDE 600; 310; 30; 20 MG/100ML; MG/100ML; MG/100ML; MG/100ML
20 INJECTION, SOLUTION INTRAVENOUS CONTINUOUS
Status: DISCONTINUED | OUTPATIENT
Start: 2024-09-03 | End: 2024-09-04 | Stop reason: HOSPADM

## 2024-09-03 ASSESSMENT — PAIN SCALES - GENERAL

## 2024-09-03 ASSESSMENT — PAIN - FUNCTIONAL ASSESSMENT
PAIN_FUNCTIONAL_ASSESSMENT: 0-10

## 2024-09-03 ASSESSMENT — COLUMBIA-SUICIDE SEVERITY RATING SCALE - C-SSRS
1. IN THE PAST MONTH, HAVE YOU WISHED YOU WERE DEAD OR WISHED YOU COULD GO TO SLEEP AND NOT WAKE UP?: NO
6. HAVE YOU EVER DONE ANYTHING, STARTED TO DO ANYTHING, OR PREPARED TO DO ANYTHING TO END YOUR LIFE?: NO
2. HAVE YOU ACTUALLY HAD ANY THOUGHTS OF KILLING YOURSELF?: NO

## 2024-09-03 NOTE — DISCHARGE INSTRUCTIONS
Patient Instructions after a Colonoscopy      The anesthetics, sedatives or narcotics which were given to you today will be acting in your body for the next 24 hours, so you might feel a little sleepy or groggy.  This feeling should slowly wear off. Carefully read and follow the instructions.     You received sedation today:  - Do not drive or operate any machinery or power tools of any kind.   - No alcoholic beverages today, not even beer or wine.  - Do not make any important decisions or sign any legal documents.  - No over the counter medications that contain alcohol or that may cause drowsiness.  - Do not make any important decisions or sign any legal documents.  - Make sure you have someone with you for first 24 hours.    While it is common to experience mild to moderate abdominal distention, gas, or belching after your procedure, if any of these symptoms occur following discharge from the GI Lab or within one week of having your procedure, call the [facility] to be advised whether a visit to your nearest Urgent Care or Emergency Department is indicated.  Take this paper with you if you go.     - If you develop an allergic reaction to the medications that were given during your procedure such as difficulty breathing, rash, hives, severe nausea, vomiting or lightheadedness.  - If you experience chest pain, shortness of breath, severe abdominal pain, fevers and chills.  -If you develop signs and symptoms of bleeding such as blood in your spit, if your stools turn black, tarry, or bloody  - If you have not urinated within 8 hours following your procedure.  - If your IV site becomes painful, red, inflamed, or looks infected.    If you received a biopsy/polypectomy/sphincterotomy the following instructions apply below:    __ Do not use Aspirin containing products, non-steroidal medications or anti-coagulants for one week following your procedure. (Examples of these types of medications are: Advil,  Arthrotec, Aleve, Coumadin, Ecotrin, Heparin, Ibuprofen, Indocin, Motrin, Naprosyn, Nuprin, Plavix, Vioxx, and Voltarin, or their generic forms.  This list is not all-inclusive.  Check with your physician or pharmacist before resuming medications.)   __ Eat a soft diet today.  Avoid foods that are poorly digested for the next 24 hours.  These foods would include: nuts, beans, lettuce, red meats, and fried foods. Start with liquids and advance your diet as tolerated, gradually work up to eating solids.   __ Do not have a Barium Study or Enema for one week.    Your physician recommends the additional following instructions:    -You have a contact number available for emergencies. The signs and symptoms of potential delayed complications were discussed with you. You may return to normal activities tomorrow.  -Resume your previous diet.  -Continue your present medications.   -We are waiting for your pathology results.  -Your physician has recommended a repeat colonoscopy (date to be determined after pending pathology results are reviewed) for surveillance based on pathology results.  -The findings and recommendations have been discussed with you.  -The findings and recommendations were discussed with your family.  - Please see Medication Reconciliation Form for new medication/medications prescribed.       If you experience any problems or have any questions following discharge from the GI Lab, please call:        Nurse Signature                                                                        Date___________________                                                                            Patient/Responsible Party Signature                                        Date___________________

## 2024-09-03 NOTE — PERIOPERATIVE NURSING NOTE
0929   pt received from procedure.  Placed on continuous cardiac / saO2 monitor.      0947  pt eating and drinking without nausea or vomiting.      0954  friend called for pt transport.  Discharge completed  using teach back method.    1000  pt able to dress without assisst.    1025  pt escorted to car via w/c in stable condition

## 2024-09-03 NOTE — H&P
History Of Present Illness  Flip Murphy is a 68 y.o. male presenting with polyp surveillance.     Past Medical History  Past Medical History:   Diagnosis Date    Abdominal distension (gaseous) 07/16/2019    Bloating    Change in bowel habit 07/16/2019    Change in bowel habits    Elevated plasma metanephrines 05/19/2016    Encounter for other preprocedural examination     Pre-op testing    Generalized abdominal pain 07/05/2019    Generalized abdominal pain    Hypertensive urgency 11/26/2022    Irritable bowel syndrome with constipation 07/02/2019    Irritable bowel syndrome with constipation    Leukocytosis 08/16/2023    Other fecal abnormalities 06/27/2019    Change in stool    Other specified diseases of intestine 09/05/2019    Small intestinal bacterial overgrowth    Other specified postprocedural states     H/O colonoscopy    Personal history of other diseases of the digestive system 05/31/2019    History of chronic constipation    S/P aortobifemoral bypass surgery 03/03/2016    Secondary diabetes (Multi) 08/16/2023    Small intestinal bacterial overgrowth 08/16/2023    Tobacco abuse counseling 02/27/2019    Tobacco abuse counseling    Tobacco use 05/06/2020    Continuous tobacco abuse     Surgical History  Past Surgical History:   Procedure Laterality Date    CT ANGIO NECK  3/24/2017    CT NECK ANGIO W AND WO IV CONTRAST 3/24/2017 [facility]    OTHER SURGICAL HISTORY  07/05/2019    Abdominal Aortic Aneurysm Repair For Dilation Or Occlusion     Social History  He reports that he quit smoking about 17 months ago. His smoking use included cigarettes. He started smoking about 47 years ago. He has a 46 pack-year smoking history. He has never used smokeless tobacco. He reports that he does not currently use alcohol. He reports that he does not currently use drugs.    Family History  No family history on file.     Allergies  No Known Allergies  Review of Systems  Pre-sedation Evaluation:  ASA  "Classification - ASA 3 - Patient with moderate systemic disease with functional limitations  Mallampati Score - II (hard and soft palate, upper portion of tonsils and uvula visible)    Physical Exam  Constitutional:       Appearance: Normal appearance.   Cardiovascular:      Rate and Rhythm: Normal rate and regular rhythm.   Pulmonary:      Effort: Pulmonary effort is normal.      Breath sounds: Normal breath sounds.   Neurological:      Mental Status: He is alert.          Last Recorded Vitals  Blood pressure 123/72, pulse 64, temperature 36.1 °C (97 °F), resp. rate 16, height 1.753 m (5' 9\"), weight 68 kg (149 lb 14.6 oz), SpO2 99%.     Assessment/Plan   R/O neoplasm for colonoscopy     PTA/Current Medications:  (Not in a hospital admission)    Current Outpatient Medications   Medication Sig Dispense Refill    atorvastatin (Lipitor) 40 mg tablet Take 1 tablet (40 mg) by mouth once daily. 90 tablet 1    Basaglar KwikPen U-100 Insulin 100 unit/mL (3 mL) pen INJECT 20 UNITS UNDER THE SKIN ONCE DAILY AT BEDTIME. 15 mL 2    clopidogrel (Plavix) 75 mg tablet Take 1 tablet (75 mg) by mouth once daily. 90 tablet 1    Creon 24,000-76,000 -120,000 unit capsule Take by mouth.      folic acid (Folvite) 1 mg tablet Take 1 tablet (1 mg) by mouth once daily. 30 tablet 6    hydrALAZINE (Apresoline) 10 mg tablet Take 1 tablet (10 mg) by mouth every 12 hours. 180 tablet 1    lipase-protease-amylase (Creon) 24,000-76,000 -120,000 unit capsule TAKE UP TO 3 CAPSULES BY MOUTH THREE TIMES A DAY WITH MEALS AND 2 CAPSULES WITH SNACKS. 1440 capsule 2    metoprolol succinate XL (Toprol-XL) 50 mg 24 hr tablet Take 1 tablet (50 mg) by mouth once daily. 90 tablet 1    SITagliptin phosphate (Januvia) 50 mg tablet Take 1 tablet (50 mg) by mouth once daily. 90 tablet 1    sodium,potassium,mag sulfates (Suprep Bowel Prep Kit) 17.5-3.13-1.6 gram recon soln solution Take 1 bottle by mouth every 12 hours. 354 mL 0    amLODIPine (Norvasc) 10 mg tablet " "Take 1 tablet (10 mg) by mouth once daily. 90 tablet 1    ammonium lactate (Amlactin) 12 % cream Apply topically if needed for dry skin. 60 g 2    blood sugar diagnostic (Contour Next Test Strips) strip test once daily 100 strip 1    pen needle, diabetic (BD Ultra-Fine Mini Pen Needle) 31 gauge x 3/16\" needle USE DAILY FOR INSULIN INJECTIONS 100 each 1    tadalafil (Cialis) 10 mg tablet Take 1 tablet (10 mg) by mouth once daily as needed for erectile dysfunction. 12 tablet 2     No current facility-administered medications for this encounter.     Vin Smart MD  "

## 2024-09-04 DIAGNOSIS — N18.4 CKD (CHRONIC KIDNEY DISEASE), STAGE IV (MULTI): Primary | ICD-10-CM

## 2024-09-04 NOTE — ADDENDUM NOTE
Encounter addended by: Irene Horton RN on: 9/4/2024 8:07 AM   Actions taken: Flowsheet accepted, Contacts section saved

## 2024-09-10 LAB
LABORATORY COMMENT REPORT: NORMAL
PATH REPORT.FINAL DX SPEC: NORMAL
PATH REPORT.GROSS SPEC: NORMAL
PATH REPORT.RELEVANT HX SPEC: NORMAL
PATH REPORT.TOTAL CANCER: NORMAL

## 2024-09-17 ENCOUNTER — SPECIALTY PHARMACY (OUTPATIENT)
Dept: PHARMACY | Facility: CLINIC | Age: 69
End: 2024-09-17

## 2024-09-17 PROCEDURE — RXMED WILLOW AMBULATORY MEDICATION CHARGE

## 2024-09-20 ENCOUNTER — PHARMACY VISIT (OUTPATIENT)
Dept: PHARMACY | Facility: CLINIC | Age: 69
End: 2024-09-20
Payer: COMMERCIAL

## 2024-10-18 ENCOUNTER — SPECIALTY PHARMACY (OUTPATIENT)
Dept: PHARMACY | Facility: CLINIC | Age: 69
End: 2024-10-18

## 2024-10-18 PROCEDURE — RXMED WILLOW AMBULATORY MEDICATION CHARGE

## 2024-10-23 ENCOUNTER — PHARMACY VISIT (OUTPATIENT)
Dept: PHARMACY | Facility: CLINIC | Age: 69
End: 2024-10-23
Payer: COMMERCIAL

## 2024-11-07 ENCOUNTER — LAB (OUTPATIENT)
Dept: LAB | Facility: CLINIC | Age: 69
End: 2024-11-07
Payer: MEDICARE

## 2024-11-07 ENCOUNTER — OFFICE VISIT (OUTPATIENT)
Dept: HEMATOLOGY/ONCOLOGY | Facility: CLINIC | Age: 69
End: 2024-11-07
Payer: MEDICARE

## 2024-11-07 VITALS
OXYGEN SATURATION: 97 % | WEIGHT: 153.6 LBS | BODY MASS INDEX: 22.68 KG/M2 | HEART RATE: 62 BPM | RESPIRATION RATE: 18 BRPM | TEMPERATURE: 97.7 F | SYSTOLIC BLOOD PRESSURE: 151 MMHG | DIASTOLIC BLOOD PRESSURE: 74 MMHG

## 2024-11-07 DIAGNOSIS — N18.32 STAGE 3B CHRONIC KIDNEY DISEASE (MULTI): ICD-10-CM

## 2024-11-07 DIAGNOSIS — D50.8 OTHER IRON DEFICIENCY ANEMIA: ICD-10-CM

## 2024-11-07 LAB
ALBUMIN SERPL BCP-MCNC: 3.5 G/DL (ref 3.4–5)
ALP SERPL-CCNC: 121 U/L (ref 33–136)
ALT SERPL W P-5'-P-CCNC: 41 U/L (ref 10–52)
ANION GAP SERPL CALC-SCNC: 14 MMOL/L (ref 10–20)
AST SERPL W P-5'-P-CCNC: 33 U/L (ref 9–39)
BASOPHILS # BLD AUTO: 0.04 X10*3/UL (ref 0–0.1)
BASOPHILS NFR BLD AUTO: 0.3 %
BILIRUB SERPL-MCNC: 0.3 MG/DL (ref 0–1.2)
BUN SERPL-MCNC: 37 MG/DL (ref 6–23)
CALCIUM SERPL-MCNC: 8.3 MG/DL (ref 8.6–10.6)
CHLORIDE SERPL-SCNC: 110 MMOL/L (ref 98–107)
CO2 SERPL-SCNC: 19 MMOL/L (ref 21–32)
CREAT SERPL-MCNC: 3.23 MG/DL (ref 0.5–1.3)
EGFRCR SERPLBLD CKD-EPI 2021: 20 ML/MIN/1.73M*2
EOSINOPHIL # BLD AUTO: 0.46 X10*3/UL (ref 0–0.7)
EOSINOPHIL NFR BLD AUTO: 3.5 %
ERYTHROCYTE [DISTWIDTH] IN BLOOD BY AUTOMATED COUNT: 14.5 % (ref 11.5–14.5)
FERRITIN SERPL-MCNC: 251 NG/ML (ref 20–300)
FOLATE SERPL-MCNC: >24 NG/ML
GLUCOSE SERPL-MCNC: 132 MG/DL (ref 74–99)
HCT VFR BLD AUTO: 36.1 % (ref 41–52)
HGB BLD-MCNC: 11.2 G/DL (ref 13.5–17.5)
IMM GRANULOCYTES # BLD AUTO: 0.03 X10*3/UL (ref 0–0.7)
IMM GRANULOCYTES NFR BLD AUTO: 0.2 % (ref 0–0.9)
IRON SATN MFR SERPL: 27 % (ref 25–45)
IRON SERPL-MCNC: 71 UG/DL (ref 35–150)
LYMPHOCYTES # BLD AUTO: 2.63 X10*3/UL (ref 1.2–4.8)
LYMPHOCYTES NFR BLD AUTO: 20.2 %
MCH RBC QN AUTO: 27.1 PG (ref 26–34)
MCHC RBC AUTO-ENTMCNC: 31 G/DL (ref 32–36)
MCV RBC AUTO: 87 FL (ref 80–100)
MONOCYTES # BLD AUTO: 0.92 X10*3/UL (ref 0.1–1)
MONOCYTES NFR BLD AUTO: 7.1 %
NEUTROPHILS # BLD AUTO: 8.96 X10*3/UL (ref 1.2–7.7)
NEUTROPHILS NFR BLD AUTO: 68.7 %
NRBC BLD-RTO: ABNORMAL /100{WBCS}
PLATELET # BLD AUTO: 286 X10*3/UL (ref 150–450)
POTASSIUM SERPL-SCNC: 5.1 MMOL/L (ref 3.5–5.3)
PROT SERPL-MCNC: 6.8 G/DL (ref 6.4–8.2)
RBC # BLD AUTO: 4.13 X10*6/UL (ref 4.5–5.9)
SODIUM SERPL-SCNC: 138 MMOL/L (ref 136–145)
TIBC SERPL-MCNC: 260 UG/DL (ref 240–445)
UIBC SERPL-MCNC: 189 UG/DL (ref 110–370)
VIT B12 SERPL-MCNC: 1365 PG/ML (ref 211–911)
WBC # BLD AUTO: 13 X10*3/UL (ref 4.4–11.3)

## 2024-11-07 PROCEDURE — 80053 COMPREHEN METABOLIC PANEL: CPT

## 2024-11-07 PROCEDURE — 83540 ASSAY OF IRON: CPT

## 2024-11-07 PROCEDURE — 3051F HG A1C>EQUAL 7.0%<8.0%: CPT | Performed by: NURSE PRACTITIONER

## 2024-11-07 PROCEDURE — 85025 COMPLETE CBC W/AUTO DIFF WBC: CPT

## 2024-11-07 PROCEDURE — 82746 ASSAY OF FOLIC ACID SERUM: CPT

## 2024-11-07 PROCEDURE — 1126F AMNT PAIN NOTED NONE PRSNT: CPT | Performed by: NURSE PRACTITIONER

## 2024-11-07 PROCEDURE — 99214 OFFICE O/P EST MOD 30 MIN: CPT | Performed by: NURSE PRACTITIONER

## 2024-11-07 PROCEDURE — 82607 VITAMIN B-12: CPT

## 2024-11-07 PROCEDURE — 1036F TOBACCO NON-USER: CPT | Performed by: NURSE PRACTITIONER

## 2024-11-07 PROCEDURE — 3048F LDL-C <100 MG/DL: CPT | Performed by: NURSE PRACTITIONER

## 2024-11-07 PROCEDURE — 82728 ASSAY OF FERRITIN: CPT

## 2024-11-07 PROCEDURE — 36415 COLL VENOUS BLD VENIPUNCTURE: CPT

## 2024-11-07 PROCEDURE — 3077F SYST BP >= 140 MM HG: CPT | Performed by: NURSE PRACTITIONER

## 2024-11-07 PROCEDURE — 3078F DIAST BP <80 MM HG: CPT | Performed by: NURSE PRACTITIONER

## 2024-11-07 PROCEDURE — 1159F MED LIST DOCD IN RCRD: CPT | Performed by: NURSE PRACTITIONER

## 2024-11-07 ASSESSMENT — PAIN SCALES - GENERAL: PAINLEVEL_OUTOF10: 0-NO PAIN

## 2024-11-07 NOTE — PROGRESS NOTES
Patient ID: Flip Murphy is a 69 y.o. male.  Referring Physician: Rosanne M Casal, APRN-CNP, DNP  [id number] [place] [address]  Primary Care Provider: Tim Hoffman DO  Visit Type: Follow Up      Subjective    Location:  [facility]  Initial consult: 5/2/2024  Reason: Anemia     Patient is a 70yo  man with a PMH of DM, CKD, elevated LFT's former 50 pack year smoker, AAA, HTN, hyperlipidemia, carotid stenosis, PVD, pulmonary HTN,  referred to benign hematology for anemia consult by Dr. Hoffman.     Follow up visit today.   Denies abnormal weight loss, night sweats, fever. Denies fatigue, chills, sob, cp, n/v/d, n/t. No PICA. Denies any abnormal bleeding or bruising. No recurrent infections or lymphadenopathy. No joint/body pain. No known blood disorders in family. Has had surgery in past w/o issue. Never had blood/blood products. Denies NSAID use.     Objective   BSA: 1.84 meters squared  /74 (BP Location: Left arm, Patient Position: Sitting, BP Cuff Size: Adult)   Pulse 62   Temp 36.5 °C (97.7 °F) (Core)   Resp 18   Wt 69.7 kg (153 lb 9.6 oz)   SpO2 97%   BMI 22.68 kg/m²      has a past medical history of Abdominal distension (gaseous) (07/16/2019), Change in bowel habit (07/16/2019), Elevated plasma metanephrines (05/19/2016), Encounter for other preprocedural examination, Generalized abdominal pain (07/05/2019), Hypertensive urgency (11/26/2022), Irritable bowel syndrome with constipation (07/02/2019), Leukocytosis (08/16/2023), Other fecal abnormalities (06/27/2019), Other specified diseases of intestine (09/05/2019), Other specified postprocedural states, Personal history of other diseases of the digestive system (05/31/2019), S/P aortobifemoral bypass surgery (03/03/2016), Secondary diabetes (Multi) (08/16/2023), Small intestinal bacterial overgrowth (08/16/2023), Tobacco abuse counseling (02/27/2019), and Tobacco use (05/06/2020).   has a past surgical history that  includes Other surgical history (07/05/2019) and CT angio neck (3/24/2017).  No family history on file.      Flip Murphy  reports that he quit smoking about 19 months ago. His smoking use included cigarettes. He started smoking about 47 years ago. He has a 46 pack-year smoking history. He has never used smokeless tobacco.  He  reports that he does not currently use alcohol.  He  reports that he does not currently use drugs.    Physical Exam  HENT:      Head: Normocephalic.      Nose: Nose normal.      Mouth/Throat:      Mouth: Mucous membranes are moist.   Eyes:      Pupils: Pupils are equal, round, and reactive to light.   Cardiovascular:      Rate and Rhythm: Normal rate and regular rhythm.      Pulses: Normal pulses.      Heart sounds: Normal heart sounds.   Pulmonary:      Effort: Pulmonary effort is normal.      Breath sounds: Normal breath sounds.   Abdominal:      General: Bowel sounds are normal.      Palpations: Abdomen is soft.   Musculoskeletal:         General: Normal range of motion.   Skin:     General: Skin is warm and dry.   Neurological:      General: No focal deficit present.      Mental Status: He is alert and oriented to person, place, and time.   Psychiatric:         Mood and Affect: Mood normal.         Behavior: Behavior normal.         WBC   Date/Time Value Ref Range Status   11/07/2024 11:04 AM 13.0 (H) 4.4 - 11.3 x10*3/uL Final   08/21/2024 11:47 AM 12.7 (H) 4.4 - 11.3 x10*3/uL Final   05/02/2024 09:54 AM 10.9 4.4 - 11.3 x10*3/uL Final     nRBC   Date Value Ref Range Status   11/07/2024   Final     Comment:     Not Measured   08/21/2024 0.0 0.0 - 0.0 /100 WBCs Final   05/02/2024   Final     Comment:     Not Measured     RBC   Date Value Ref Range Status   11/07/2024 4.13 (L) 4.50 - 5.90 x10*6/uL Final   08/21/2024 4.19 (L) 4.50 - 5.90 x10*6/uL Final   05/02/2024 4.47 (L) 4.50 - 5.90 x10*6/uL Final     Hemoglobin   Date Value Ref Range Status   11/07/2024 11.2 (L) 13.5 - 17.5 g/dL Final  "  08/21/2024 11.3 (L) 13.5 - 17.5 g/dL Final   05/02/2024 12.2 (L) 13.5 - 17.5 g/dL Final     Hematocrit   Date Value Ref Range Status   11/07/2024 36.1 (L) 41.0 - 52.0 % Final   08/21/2024 36.3 (L) 41.0 - 52.0 % Final   05/02/2024 38.9 (L) 41.0 - 52.0 % Final     MCV   Date/Time Value Ref Range Status   11/07/2024 11:04 AM 87 80 - 100 fL Final   08/21/2024 11:47 AM 87 80 - 100 fL Final   05/02/2024 09:54 AM 87 80 - 100 fL Final     MCH   Date/Time Value Ref Range Status   11/07/2024 11:04 AM 27.1 26.0 - 34.0 pg Final   08/21/2024 11:47 AM 27.0 26.0 - 34.0 pg Final   05/02/2024 09:54 AM 27.3 26.0 - 34.0 pg Final     MCHC   Date/Time Value Ref Range Status   11/07/2024 11:04 AM 31.0 (L) 32.0 - 36.0 g/dL Final   08/21/2024 11:47 AM 31.1 (L) 32.0 - 36.0 g/dL Final   05/02/2024 09:54 AM 31.4 (L) 32.0 - 36.0 g/dL Final     RDW   Date/Time Value Ref Range Status   11/07/2024 11:04 AM 14.5 11.5 - 14.5 % Final   08/21/2024 11:47 AM 14.2 11.5 - 14.5 % Final   05/02/2024 09:54 AM 14.3 11.5 - 14.5 % Final     Platelets   Date/Time Value Ref Range Status   11/07/2024 11:04  150 - 450 x10*3/uL Final   08/21/2024 11:47  150 - 450 x10*3/uL Final   05/02/2024 09:54  150 - 450 x10*3/uL Final     No results found for: \"MPV\"  Neutrophils %   Date/Time Value Ref Range Status   11/07/2024 11:04 AM 68.7 40.0 - 80.0 % Final   08/21/2024 11:47 AM 66.7 40.0 - 80.0 % Final   05/02/2024 09:54 AM 67.7 40.0 - 80.0 % Final     Immature Granulocytes %, Automated   Date/Time Value Ref Range Status   11/07/2024 11:04 AM 0.2 0.0 - 0.9 % Final     Comment:     Immature Granulocyte Count (IG) includes promyelocytes, myelocytes and metamyelocytes but does not include bands. Percent differential counts (%) should be interpreted in the context of the absolute cell counts (cells/UL).   08/21/2024 11:47 AM 0.5 0.0 - 0.9 % Final     Comment:     Immature Granulocyte Count (IG) includes promyelocytes, myelocytes and metamyelocytes but does " not include bands. Percent differential counts (%) should be interpreted in the context of the absolute cell counts (cells/UL).   05/02/2024 09:54 AM 0.3 0.0 - 0.9 % Final     Comment:     Immature Granulocyte Count (IG) includes promyelocytes, myelocytes and metamyelocytes but does not include bands. Percent differential counts (%) should be interpreted in the context of the absolute cell counts (cells/UL).     Lymphocytes %   Date/Time Value Ref Range Status   11/07/2024 11:04 AM 20.2 13.0 - 44.0 % Final   08/21/2024 11:47 AM 18.8 13.0 - 44.0 % Final   05/02/2024 09:54 AM 18.0 13.0 - 44.0 % Final     Monocytes %   Date/Time Value Ref Range Status   11/07/2024 11:04 AM 7.1 2.0 - 10.0 % Final   08/21/2024 11:47 AM 10.0 2.0 - 10.0 % Final   05/02/2024 09:54 AM 8.9 2.0 - 10.0 % Final     Eosinophils %   Date/Time Value Ref Range Status   11/07/2024 11:04 AM 3.5 0.0 - 6.0 % Final   08/21/2024 11:47 AM 3.1 0.0 - 6.0 % Final   05/02/2024 09:54 AM 4.3 0.0 - 6.0 % Final     Basophils %   Date/Time Value Ref Range Status   11/07/2024 11:04 AM 0.3 0.0 - 2.0 % Final   08/21/2024 11:47 AM 0.9 0.0 - 2.0 % Final   05/02/2024 09:54 AM 0.8 0.0 - 2.0 % Final     Neutrophils Absolute   Date/Time Value Ref Range Status   11/07/2024 11:04 AM 8.96 (H) 1.20 - 7.70 x10*3/uL Final     Comment:     Percent differential counts (%) should be interpreted in the context of the absolute cell counts (cells/uL).   08/21/2024 11:47 AM 8.49 (H) 1.20 - 7.70 x10*3/uL Final     Comment:     Percent differential counts (%) should be interpreted in the context of the absolute cell counts (cells/uL).   05/02/2024 09:54 AM 7.36 1.20 - 7.70 x10*3/uL Final     Comment:     Percent differential counts (%) should be interpreted in the context of the absolute cell counts (cells/uL).     Immature Granulocytes Absolute, Automated   Date/Time Value Ref Range Status   11/07/2024 11:04 AM 0.03 0.00 - 0.70 x10*3/uL Final   08/21/2024 11:47 AM 0.07 0.00 - 0.70  x10*3/uL Final   05/02/2024 09:54 AM 0.03 0.00 - 0.70 x10*3/uL Final     Lymphocytes Absolute   Date/Time Value Ref Range Status   11/07/2024 11:04 AM 2.63 1.20 - 4.80 x10*3/uL Final   08/21/2024 11:47 AM 2.40 1.20 - 4.80 x10*3/uL Final   05/02/2024 09:54 AM 1.96 1.20 - 4.80 x10*3/uL Final     Monocytes Absolute   Date/Time Value Ref Range Status   11/07/2024 11:04 AM 0.92 0.10 - 1.00 x10*3/uL Final   08/21/2024 11:47 AM 1.27 (H) 0.10 - 1.00 x10*3/uL Final   05/02/2024 09:54 AM 0.97 0.10 - 1.00 x10*3/uL Final     Eosinophils Absolute   Date/Time Value Ref Range Status   11/07/2024 11:04 AM 0.46 0.00 - 0.70 x10*3/uL Final   08/21/2024 11:47 AM 0.40 0.00 - 0.70 x10*3/uL Final   05/02/2024 09:54 AM 0.47 0.00 - 0.70 x10*3/uL Final     Basophils Absolute   Date/Time Value Ref Range Status   11/07/2024 11:04 AM 0.04 0.00 - 0.10 x10*3/uL Final   08/21/2024 11:47 AM 0.11 (H) 0.00 - 0.10 x10*3/uL Final   05/02/2024 09:54 AM 0.09 0.00 - 0.10 x10*3/uL Final      Latest Reference Range & Units 05/02/24 09:54   Protein Electrophoresis Comment  Hypoalbuminemia.      Albumin 3.4 - 5.0 g/dL 2.8 (L)   Alpha 1 Globulin 0.2 - 0.6 g/dL 0.3   Alpha 2 Globulin 0.4 - 1.1 g/dL 1.0   Gamma 0.5 - 1.4 g/dL 1.1   Beta Globulin 0.5 - 1.2 g/dL 0.8   Beta-2 Microglobulin 0.7 - 2.2 mg/L 7.3 (H)   Ig Kappa Free Light Chain 0.33 - 1.94 mg/dL 10.05 (H)   Ig Lambda Free Light Chain 0.57 - 2.63 mg/dL 6.34 (H)   Kappa/Lambda Ratio 0.26 - 1.65  1.59   Immunofixation Comment  No monoclonal protein detected by immunofixation.   Path Review - Serum Immunofixation  Reviewed and approved by SIVAKUMAR DIAZ on 5/7/24 at 9:18 AM.       Path Review - Serum Protein Electrophoresis   Reviewed and approved by SIVAKUMAR DIAZ on 5/7/24 at 9:17 AM.       (L): Data is abnormally low  (H): Data is abnormally high  Assessment/Plan        69 yo  man with a PMH of DM, CKD, elevated LFT's former 50 pack year smoker, AAA, HTN, hyperlipidemia, carotid stenosis,  PVD, pulmonary HTN,  referred to benign hematology for anemia consult by Dr. Hoffman.     Review of labs noted a mild normocytic anemia. Last labs were drawn in August. Hgb 11.3, MCV 87, remainder of CBC/diff WNL at that time. Last GFR 30, creatinine 3.18. He has an appointment to see Nephrology. Patient has elevated light chains most likely due to CKD. No MGUS     Discussed possible etiologies including multifactorial, nutritional deficiencies, anemia of chronic disease, malabsorption, hemopathy, medications, bleeding, malignancy, etc. Our workup showed a mild normocytic anemia most likely secondary to CKD. SFLC ratio elevated most likely due to CKD. No monoclonal protein noted. Adequate iron and B12, however low folic acid was 4.8 in May. Patient has been taking folic acid 1 mg po daily. At some point may need EPO support but with hgb of 11.2 this is not needed at this time.   Plan:  Continue folic acid 1mg po daily. Prescrition sent to his local pharmacy.   CBC/diff, iron studies, B12, Folate and CMP in 3 months and again in 6 months with exam.   Follow up with PCP/Nephrology for CKD and other issues.     I had an extensive discussion with the patient regarding the diagnosis and discussed the plan of therapy, including general considerations regarding side effects and outcomes. Pt understood and gave appropriate teach back about the plan of care. All questions were answered to the patient's satisfaction. The patient is instructed to contact us at any time if questions or problems arise. Thank you for the opportunity to participate in the care of this very pleasant patient.          Rosanne M Casal, APRN-CNP, DNP

## 2024-11-20 ENCOUNTER — SPECIALTY PHARMACY (OUTPATIENT)
Dept: PHARMACY | Facility: CLINIC | Age: 69
End: 2024-11-20

## 2024-11-20 PROCEDURE — RXMED WILLOW AMBULATORY MEDICATION CHARGE

## 2024-11-22 ENCOUNTER — PHARMACY VISIT (OUTPATIENT)
Dept: PHARMACY | Facility: CLINIC | Age: 69
End: 2024-11-22
Payer: COMMERCIAL

## 2024-12-16 ENCOUNTER — SPECIALTY PHARMACY (OUTPATIENT)
Dept: PHARMACY | Facility: CLINIC | Age: 69
End: 2024-12-16

## 2024-12-16 PROCEDURE — RXMED WILLOW AMBULATORY MEDICATION CHARGE

## 2024-12-17 ENCOUNTER — SPECIALTY PHARMACY (OUTPATIENT)
Dept: PHARMACY | Facility: CLINIC | Age: 69
End: 2024-12-17

## 2024-12-20 ENCOUNTER — PHARMACY VISIT (OUTPATIENT)
Dept: PHARMACY | Facility: CLINIC | Age: 69
End: 2024-12-20
Payer: COMMERCIAL

## 2025-01-14 ENCOUNTER — SPECIALTY PHARMACY (OUTPATIENT)
Dept: PHARMACY | Facility: CLINIC | Age: 70
End: 2025-01-14

## 2025-01-23 ENCOUNTER — LAB (OUTPATIENT)
Dept: LAB | Facility: LAB | Age: 70
End: 2025-01-23
Payer: MEDICARE

## 2025-01-23 ENCOUNTER — APPOINTMENT (OUTPATIENT)
Dept: NEPHROLOGY | Facility: CLINIC | Age: 70
End: 2025-01-23
Payer: MEDICARE

## 2025-01-23 VITALS
WEIGHT: 171 LBS | DIASTOLIC BLOOD PRESSURE: 104 MMHG | SYSTOLIC BLOOD PRESSURE: 166 MMHG | HEART RATE: 70 BPM | HEIGHT: 69 IN | BODY MASS INDEX: 25.33 KG/M2

## 2025-01-23 DIAGNOSIS — I10 ESSENTIAL HYPERTENSION: ICD-10-CM

## 2025-01-23 DIAGNOSIS — N18.4 CKD (CHRONIC KIDNEY DISEASE), STAGE IV (MULTI): ICD-10-CM

## 2025-01-23 DIAGNOSIS — N18.4 ANEMIA DUE TO STAGE 4 CHRONIC KIDNEY DISEASE (MULTI): ICD-10-CM

## 2025-01-23 DIAGNOSIS — D63.1 ANEMIA DUE TO STAGE 4 CHRONIC KIDNEY DISEASE (MULTI): ICD-10-CM

## 2025-01-23 DIAGNOSIS — N18.4 CKD (CHRONIC KIDNEY DISEASE), STAGE IV (MULTI): Primary | ICD-10-CM

## 2025-01-23 DIAGNOSIS — E11.22 TYPE 2 DIABETES MELLITUS WITH DIABETIC CHRONIC KIDNEY DISEASE, UNSPECIFIED CKD STAGE, UNSPECIFIED WHETHER LONG TERM INSULIN USE: Primary | ICD-10-CM

## 2025-01-23 LAB
ALBUMIN SERPL BCP-MCNC: 3.4 G/DL (ref 3.4–5)
ANION GAP SERPL CALC-SCNC: 18 MMOL/L (ref 10–20)
BUN SERPL-MCNC: 50 MG/DL (ref 6–23)
C3 SERPL-MCNC: 133 MG/DL (ref 87–200)
C4 SERPL-MCNC: 45 MG/DL (ref 10–50)
CALCIUM SERPL-MCNC: 7.6 MG/DL (ref 8.6–10.6)
CHLORIDE SERPL-SCNC: 116 MMOL/L (ref 98–107)
CHOLEST SERPL-MCNC: 126 MG/DL (ref 0–199)
CHOLESTEROL/HDL RATIO: 3.9
CO2 SERPL-SCNC: 15 MMOL/L (ref 21–32)
CREAT SERPL-MCNC: 3.94 MG/DL (ref 0.5–1.3)
EGFRCR SERPLBLD CKD-EPI 2021: 16 ML/MIN/1.73M*2
ERYTHROCYTE [DISTWIDTH] IN BLOOD BY AUTOMATED COUNT: 14 % (ref 11.5–14.5)
GLUCOSE SERPL-MCNC: 80 MG/DL (ref 74–99)
HBV SURFACE AB SER-ACNC: <3.1 MIU/ML
HBV SURFACE AG SERPL QL IA: NONREACTIVE
HCT VFR BLD AUTO: 39.4 % (ref 41–52)
HCV AB SER QL: NONREACTIVE
HDLC SERPL-MCNC: 32.2 MG/DL
HGB BLD-MCNC: 11.6 G/DL (ref 13.5–17.5)
LDLC SERPL CALC-MCNC: 76 MG/DL
MCH RBC QN AUTO: 27.2 PG (ref 26–34)
MCHC RBC AUTO-ENTMCNC: 29.4 G/DL (ref 32–36)
MCV RBC AUTO: 92 FL (ref 80–100)
NON HDL CHOLESTEROL: 94 MG/DL (ref 0–149)
NRBC BLD-RTO: 0 /100 WBCS (ref 0–0)
PHOSPHATE SERPL-MCNC: 4.9 MG/DL (ref 2.5–4.9)
PLATELET # BLD AUTO: 261 X10*3/UL (ref 150–450)
POTASSIUM SERPL-SCNC: 5.1 MMOL/L (ref 3.5–5.3)
PTH-INTACT SERPL-MCNC: 788.1 PG/ML (ref 18.5–88)
RBC # BLD AUTO: 4.27 X10*6/UL (ref 4.5–5.9)
SODIUM SERPL-SCNC: 144 MMOL/L (ref 136–145)
TRIGL SERPL-MCNC: 88 MG/DL (ref 0–149)
VLDL: 18 MG/DL (ref 0–40)
WBC # BLD AUTO: 12.2 X10*3/UL (ref 4.4–11.3)

## 2025-01-23 PROCEDURE — 1126F AMNT PAIN NOTED NONE PRSNT: CPT | Performed by: INTERNAL MEDICINE

## 2025-01-23 PROCEDURE — 86160 COMPLEMENT ANTIGEN: CPT

## 2025-01-23 PROCEDURE — 86255 FLUORESCENT ANTIBODY SCREEN: CPT

## 2025-01-23 PROCEDURE — 86803 HEPATITIS C AB TEST: CPT

## 2025-01-23 PROCEDURE — 87340 HEPATITIS B SURFACE AG IA: CPT

## 2025-01-23 PROCEDURE — 86038 ANTINUCLEAR ANTIBODIES: CPT

## 2025-01-23 PROCEDURE — 99205 OFFICE O/P NEW HI 60 MIN: CPT | Performed by: INTERNAL MEDICINE

## 2025-01-23 PROCEDURE — 1036F TOBACCO NON-USER: CPT | Performed by: INTERNAL MEDICINE

## 2025-01-23 PROCEDURE — 1159F MED LIST DOCD IN RCRD: CPT | Performed by: INTERNAL MEDICINE

## 2025-01-23 PROCEDURE — 3078F DIAST BP <80 MM HG: CPT | Performed by: INTERNAL MEDICINE

## 2025-01-23 PROCEDURE — 85027 COMPLETE CBC AUTOMATED: CPT

## 2025-01-23 PROCEDURE — 86706 HEP B SURFACE ANTIBODY: CPT

## 2025-01-23 PROCEDURE — 80069 RENAL FUNCTION PANEL: CPT

## 2025-01-23 PROCEDURE — 80061 LIPID PANEL: CPT

## 2025-01-23 PROCEDURE — 3008F BODY MASS INDEX DOCD: CPT | Performed by: INTERNAL MEDICINE

## 2025-01-23 PROCEDURE — 3077F SYST BP >= 140 MM HG: CPT | Performed by: INTERNAL MEDICINE

## 2025-01-23 PROCEDURE — 83516 IMMUNOASSAY NONANTIBODY: CPT

## 2025-01-23 PROCEDURE — 83970 ASSAY OF PARATHORMONE: CPT

## 2025-01-23 PROCEDURE — 36415 COLL VENOUS BLD VENIPUNCTURE: CPT

## 2025-01-23 ASSESSMENT — PAIN SCALES - GENERAL: PAINLEVEL_OUTOF10: 0-NO PAIN

## 2025-01-23 NOTE — PROGRESS NOTES
Patient ID: Flip Murphy is a 69 y.o. male who is seen in nephrology clinic for worsening kidney disease over the past 4 years.  Has a hx of DM2, hypertension, AAA, hyperipidemia.  Most recently creatinine up to 3.2 mg/dL.  There is a mild acidemia and hypocalcemia but otherwise no major electrolyte abnormalities.   No urine studies sine 2020 at which time there 1.8 g proteinuria     Imaging in 2022 with no obstruction, stones.  Some cysts were seen bilaterally    Has been diabetic for approximately 10-15 years.  No evidence of retinopathy on recent exam.  Denies footulcers, no symptoms of neuropathy Last A1c 7.1    HTN for approximately 10 -15 years.  Home blood pressures are not known.      CLARA negative    Weight stable.  No lower ext edema.  No epistaxis, no rashes, bruises, joint pains,     No dysuria, no gross hematuria, 1-2 episodes nocturia, no flank discomfort    Review of systems negative unless noted in HPI      Patient Active Problem List   Diagnosis    AAA (abdominal aortic aneurysm) (CMS-[facility])    Essential hypertension    Bruit of left carotid artery    Cataract, nuclear sclerotic, both eyes    Chronic cough    Chronic diarrhea    Chronic obstructive pulmonary disease (Multi)    Chronic right shoulder pain    CKD (chronic kidney disease)    Type 2 diabetes mellitus    Early dry stage nonexudative age-related macular degeneration of left eye    Elevated liver enzymes    Erectile dysfunction    GERD (gastroesophageal reflux disease)    Hyperlipidemia    Hyperopia of both eyes    Hypoglycemia    Carotid stenosis, bilateral    PVD (peripheral vascular disease) (CMS-HCC)    Primary insomnia    Secondary pancreatic insufficiency ([facility])    Small bowel obstruction (Multi)    Stenosis of right carotid artery    Tobacco use disorder, moderate, in early remission    Pulmonary HTN (Multi)    Generalized abdominal pressure    Claudication (CMS-HCC)    Hypomagnesemia    Pyelonephritis    Aortoiliac occlusive  "disease (Multi)    Tricuspid valve disorder    Emphysema, unspecified    Acute on chronic pancreatitis (Multi)    Type 2 diabetes mellitus with other circulatory complication, with long-term current use of insulin    Chronic renal disease, stage IV (Multi)        No family history on file.    Social History     Tobacco Use    Smoking status: Former     Current packs/day: 0.00     Average packs/day: 1 pack/day for 46.0 years (46.0 ttl pk-yrs)     Types: Cigarettes     Start date: 3/11/1977     Quit date: 3/11/2023     Years since quittin.8    Smokeless tobacco: Never   Substance Use Topics    Alcohol use: Not Currently             Current Outpatient Medications:     amLODIPine (Norvasc) 10 mg tablet, Take 1 tablet (10 mg) by mouth once daily., Disp: 90 tablet, Rfl: 1    ammonium lactate (Amlactin) 12 % cream, Apply topically if needed for dry skin., Disp: 60 g, Rfl: 2    atorvastatin (Lipitor) 40 mg tablet, Take 1 tablet (40 mg) by mouth once daily., Disp: 90 tablet, Rfl: 1    Basaglar KwikPen U-100 Insulin 100 unit/mL (3 mL) pen, INJECT 20 UNITS UNDER THE SKIN ONCE DAILY AT BEDTIME., Disp: 15 mL, Rfl: 2    blood sugar diagnostic (Contour Next Test Strips) strip, test once daily, Disp: 100 strip, Rfl: 1    Creon 24,000-76,000 -120,000 unit capsule, Take by mouth., Disp: , Rfl:     folic acid (Folvite) 1 mg tablet, Take 1 tablet (1 mg) by mouth once daily., Disp: 30 tablet, Rfl: 6    hydrALAZINE (Apresoline) 10 mg tablet, Take 1 tablet (10 mg) by mouth every 12 hours., Disp: 180 tablet, Rfl: 1    lipase-protease-amylase (Creon) 24,000-76,000 -120,000 unit capsule, TAKE UP TO 3 CAPSULES BY MOUTH THREE TIMES A DAY WITH MEALS AND 2 CAPSULES WITH SNACKS., Disp: 1440 capsule, Rfl: 2    metoprolol succinate XL (Toprol-XL) 50 mg 24 hr tablet, Take 1 tablet (50 mg) by mouth once daily., Disp: 90 tablet, Rfl: 1    pen needle, diabetic (BD Ultra-Fine Mini Pen Needle) 31 gauge x 3/16\" needle, USE DAILY FOR INSULIN " "INJECTIONS, Disp: 100 each, Rfl: 1    SITagliptin phosphate (Januvia) 50 mg tablet, Take 1 tablet (50 mg) by mouth once daily., Disp: 90 tablet, Rfl: 1    clopidogrel (Plavix) 75 mg tablet, Take 1 tablet (75 mg) by mouth once daily. (Patient not taking: Reported on 1/23/2025), Disp: 90 tablet, Rfl: 1    sodium,potassium,mag sulfates (Suprep Bowel Prep Kit) 17.5-3.13-1.6 gram recon soln solution, Take 1 bottle by mouth every 12 hours. (Patient not taking: Reported on 1/23/2025), Disp: 354 mL, Rfl: 0    tadalafil (Cialis) 10 mg tablet, Take 1 tablet (10 mg) by mouth once daily as needed for erectile dysfunction., Disp: 12 tablet, Rfl: 2       Vitals:    01/23/25 1047   BP: (!) 166/104   Pulse: 70        Physical Exam  Gen alert, pleasant  Heart RRR, no murmurs or rubs  Lungs clear bilaterally  Abd soft, no bruits noted  Ext no lower extremity edema bilaterally, normal pulses  Skin no bruises or rashes on visible skin surfaces  Neuro no asterixis, no focal deficits  Psych normal affect      Lab Results   Component Value Date    WBC 13.0 (H) 11/07/2024    HGB 11.2 (L) 11/07/2024    HCT 36.1 (L) 11/07/2024    MCV 87 11/07/2024     11/07/2024      Lab Results   Component Value Date    GLUCOSE 132 (H) 11/07/2024    CALCIUM 8.3 (L) 11/07/2024     11/07/2024    K 5.1 11/07/2024    CO2 19 (L) 11/07/2024     (H) 11/07/2024    BUN 37 (H) 11/07/2024    CREATININE 3.23 (H) 11/07/2024      Lab Results   Component Value Date    MICROALBCREA 1,827.5 (H) 01/09/2020        No results found for: \"PTH\", \"UTPCR\"             Assessment/Plan   CKD 4-  with proteinuria noted in 2020.  etiology not fully clear but suspect in part, there is underlying diabetic nephropathy.  There is accompanying mild acidemia and mild hypocalcemia.  No recent imaging studies available for review    Will obtain a serologic workup in light of relatively rapid decrease in kidney function over the past few years.  Also obtain a kidney " ultrasound    May need a kidney biopsy.      Htn - home bp's are not known. Advised to obtain a bp cuff and monitor and check bp twice daily for a week.  Will discuss results with him when I call with labs results    Ckd-mbd - mild acidemia noted.  Will repeat labs today, include a pth value.  May need to start bicarbonate supplement    Anemia - seen by hematology.  Likely a component of anemia of ckd.  No need for GAYATHRI at this time

## 2025-01-24 DIAGNOSIS — N25.81 SECONDARY HYPERPARATHYROIDISM (MULTI): Primary | ICD-10-CM

## 2025-01-24 DIAGNOSIS — E87.22 CHRONIC METABOLIC ACIDOSIS: ICD-10-CM

## 2025-01-24 LAB — ANA SER QL HEP2 SUBST: NEGATIVE

## 2025-01-24 RX ORDER — CALCITRIOL 0.25 UG/1
0.25 CAPSULE ORAL DAILY
Qty: 90 CAPSULE | Refills: 1 | Status: SHIPPED | OUTPATIENT
Start: 2025-01-24 | End: 2025-07-23

## 2025-01-24 RX ORDER — SODIUM BICARBONATE 650 MG/1
1300 TABLET ORAL 2 TIMES DAILY
Qty: 360 TABLET | Refills: 3 | Status: SHIPPED | OUTPATIENT
Start: 2025-01-24 | End: 2026-01-24

## 2025-01-25 LAB
MYELOPEROXIDASE AB SER-ACNC: 0 AU/ML (ref 0–19)
PLA2R IGG SERPL QL IF: NORMAL
PROTEINASE3 AB SER-ACNC: 4 AU/ML (ref 0–19)

## 2025-01-29 DIAGNOSIS — K86.89 SECONDARY PANCREATIC INSUFFICIENCY (HHS-HCC): ICD-10-CM

## 2025-01-29 RX ORDER — PANCRELIPASE 24000; 76000; 120000 [USP'U]/1; [USP'U]/1; [USP'U]/1
CAPSULE, DELAYED RELEASE PELLETS ORAL
Qty: 1440 CAPSULE | Refills: 2 | Status: SHIPPED | OUTPATIENT
Start: 2025-01-29 | End: 2026-01-29

## 2025-01-30 DIAGNOSIS — N18.4 CKD (CHRONIC KIDNEY DISEASE), STAGE IV (MULTI): Primary | ICD-10-CM

## 2025-01-30 DIAGNOSIS — E11.22 TYPE 2 DIABETES MELLITUS WITH DIABETIC CHRONIC KIDNEY DISEASE, UNSPECIFIED CKD STAGE, UNSPECIFIED WHETHER LONG TERM INSULIN USE: ICD-10-CM

## 2025-01-31 ENCOUNTER — TELEPHONE (OUTPATIENT)
Dept: NEPHROLOGY | Facility: CLINIC | Age: 70
End: 2025-01-31
Payer: MEDICARE

## 2025-01-31 DIAGNOSIS — N18.4 CKD (CHRONIC KIDNEY DISEASE), STAGE IV (MULTI): Primary | ICD-10-CM

## 2025-02-05 ENCOUNTER — HOSPITAL ENCOUNTER (OUTPATIENT)
Dept: RADIOLOGY | Facility: HOSPITAL | Age: 70
Discharge: HOME | End: 2025-02-05
Payer: MEDICARE

## 2025-02-05 DIAGNOSIS — N18.4 CKD (CHRONIC KIDNEY DISEASE), STAGE IV (MULTI): ICD-10-CM

## 2025-02-05 PROCEDURE — 76770 US EXAM ABDO BACK WALL COMP: CPT

## 2025-02-19 ENCOUNTER — APPOINTMENT (OUTPATIENT)
Dept: PRIMARY CARE | Facility: CLINIC | Age: 70
End: 2025-02-19
Payer: MEDICARE

## 2025-02-19 VITALS
SYSTOLIC BLOOD PRESSURE: 132 MMHG | HEIGHT: 69 IN | DIASTOLIC BLOOD PRESSURE: 82 MMHG | BODY MASS INDEX: 23.25 KG/M2 | WEIGHT: 157 LBS

## 2025-02-19 DIAGNOSIS — E78.5 HYPERLIPIDEMIA, UNSPECIFIED HYPERLIPIDEMIA TYPE: ICD-10-CM

## 2025-02-19 DIAGNOSIS — I10 HYPERTENSION, UNSPECIFIED TYPE: ICD-10-CM

## 2025-02-19 DIAGNOSIS — N18.4 CHRONIC KIDNEY DISEASE (CKD), STAGE IV (SEVERE) (MULTI): ICD-10-CM

## 2025-02-19 DIAGNOSIS — I10 ESSENTIAL HYPERTENSION: ICD-10-CM

## 2025-02-19 DIAGNOSIS — E11.9 TYPE 2 DIABETES MELLITUS WITHOUT COMPLICATION, UNSPECIFIED WHETHER LONG TERM INSULIN USE (MULTI): Primary | ICD-10-CM

## 2025-02-19 DIAGNOSIS — N18.32 STAGE 3B CHRONIC KIDNEY DISEASE (MULTI): ICD-10-CM

## 2025-02-19 DIAGNOSIS — K86.1 ACUTE ON CHRONIC PANCREATITIS (MULTI): ICD-10-CM

## 2025-02-19 DIAGNOSIS — D50.8 OTHER IRON DEFICIENCY ANEMIA: ICD-10-CM

## 2025-02-19 DIAGNOSIS — J43.9 PULMONARY EMPHYSEMA, UNSPECIFIED EMPHYSEMA TYPE (MULTI): ICD-10-CM

## 2025-02-19 DIAGNOSIS — I27.20 PULMONARY HTN (MULTI): ICD-10-CM

## 2025-02-19 DIAGNOSIS — K85.90 ACUTE ON CHRONIC PANCREATITIS (MULTI): ICD-10-CM

## 2025-02-19 DIAGNOSIS — I74.09 AORTOILIAC OCCLUSIVE DISEASE (MULTI): ICD-10-CM

## 2025-02-19 LAB
EST. AVERAGE GLUCOSE BLD GHB EST-MCNC: 171 MG/DL
EST. AVERAGE GLUCOSE BLD GHB EST-SCNC: 9.5 MMOL/L
HBA1C MFR BLD: 7.6 % OF TOTAL HGB

## 2025-02-19 PROCEDURE — 1036F TOBACCO NON-USER: CPT | Performed by: INTERNAL MEDICINE

## 2025-02-19 PROCEDURE — 3079F DIAST BP 80-89 MM HG: CPT | Performed by: INTERNAL MEDICINE

## 2025-02-19 PROCEDURE — 3008F BODY MASS INDEX DOCD: CPT | Performed by: INTERNAL MEDICINE

## 2025-02-19 PROCEDURE — 1160F RVW MEDS BY RX/DR IN RCRD: CPT | Performed by: INTERNAL MEDICINE

## 2025-02-19 PROCEDURE — 3048F LDL-C <100 MG/DL: CPT | Performed by: INTERNAL MEDICINE

## 2025-02-19 PROCEDURE — G0439 PPPS, SUBSEQ VISIT: HCPCS | Performed by: INTERNAL MEDICINE

## 2025-02-19 PROCEDURE — 1159F MED LIST DOCD IN RCRD: CPT | Performed by: INTERNAL MEDICINE

## 2025-02-19 PROCEDURE — 99214 OFFICE O/P EST MOD 30 MIN: CPT | Performed by: INTERNAL MEDICINE

## 2025-02-19 PROCEDURE — 3075F SYST BP GE 130 - 139MM HG: CPT | Performed by: INTERNAL MEDICINE

## 2025-02-19 RX ORDER — FOLIC ACID 1 MG/1
1 TABLET ORAL DAILY
Qty: 30 TABLET | Refills: 6 | Status: SHIPPED | OUTPATIENT
Start: 2025-02-19 | End: 2026-02-19

## 2025-02-19 RX ORDER — METOPROLOL SUCCINATE 50 MG/1
50 TABLET, EXTENDED RELEASE ORAL DAILY
Qty: 90 TABLET | Refills: 1 | Status: SHIPPED | OUTPATIENT
Start: 2025-02-19

## 2025-02-19 RX ORDER — ATORVASTATIN CALCIUM 40 MG/1
40 TABLET, FILM COATED ORAL DAILY
Qty: 90 TABLET | Refills: 1 | Status: SHIPPED | OUTPATIENT
Start: 2025-02-19

## 2025-02-19 RX ORDER — AMLODIPINE BESYLATE 10 MG/1
10 TABLET ORAL DAILY
Qty: 90 TABLET | Refills: 1 | Status: SHIPPED | OUTPATIENT
Start: 2025-02-19

## 2025-02-19 NOTE — PROGRESS NOTES
"Subjective   Patient ID: Flip Murphy is a 69 y.o. male who presents for Follow-up (Med erefill).    HPI     Review of Systems    Objective   /74   Ht 1.753 m (5' 9\")   Wt 71.2 kg (157 lb)   BMI 23.18 kg/m²     Physical Exam    Assessment/Plan          "

## 2025-02-19 NOTE — PROGRESS NOTES
Subjective   Patient ID: Flip Murphy is a 69 y.o. male who presents for Follow-up (Med erefill).Medicare Wellness Exam/Comprehensive Problem Focused Follow Up     HPI        Chief Complaint:   HPI:   with a medical history of DM2, HTN, HLD, IBS reported pancreatic insufficiency, and AAA presenting to the office for an annual check up.   Reported ER presentation [place] November 2022 pancreatitis severe hyperglycemia    Overall feels well here for medication refills    HPI:      Active Problem List      Comprehensive Medical/Surgical/Social/Family History  Past Medical History:   Diagnosis Date    Abdominal distension (gaseous) 07/16/2019    Bloating    Change in bowel habit 07/16/2019    Change in bowel habits    Elevated plasma metanephrines 05/19/2016    Encounter for other preprocedural examination     Pre-op testing    Generalized abdominal pain 07/05/2019    Generalized abdominal pain    Hypertensive urgency 11/26/2022    Irritable bowel syndrome with constipation 07/02/2019    Irritable bowel syndrome with constipation    Leukocytosis 08/16/2023    Other fecal abnormalities 06/27/2019    Change in stool    Other specified diseases of intestine 09/05/2019    Small intestinal bacterial overgrowth    Other specified postprocedural states     H/O colonoscopy    Personal history of other diseases of the digestive system 05/31/2019    History of chronic constipation    S/P aortobifemoral bypass surgery 03/03/2016    Secondary diabetes (Multi) 08/16/2023    Small intestinal bacterial overgrowth 08/16/2023    Tobacco abuse counseling 02/27/2019    Tobacco abuse counseling    Tobacco use 05/06/2020    Continuous tobacco abuse     Past Surgical History:   Procedure Laterality Date    CT ANGIO NECK  3/24/2017    CT NECK ANGIO W AND WO IV CONTRAST 3/24/2017 [facility]    OTHER SURGICAL HISTORY  07/05/2019    Abdominal Aortic Aneurysm Repair For Dilation Or Occlusion     Social History     Social History  "Narrative    Not on file         Allergies and Medications  Patient has no known allergies.  Current Outpatient Medications on File Prior to Visit   Medication Sig Dispense Refill    ammonium lactate (Amlactin) 12 % cream Apply topically if needed for dry skin. 60 g 2    Basaglar KwikPen U-100 Insulin 100 unit/mL (3 mL) pen INJECT 20 UNITS UNDER THE SKIN ONCE DAILY AT BEDTIME. 15 mL 2    blood sugar diagnostic (Contour Next Test Strips) strip test once daily 100 strip 1    calcitriol (Rocaltrol) 0.25 mcg capsule Take 1 capsule (0.25 mcg) by mouth once daily. 90 capsule 1    Creon 24,000-76,000 -120,000 unit capsule Take by mouth.      hydrALAZINE (Apresoline) 10 mg tablet Take 1 tablet (10 mg) by mouth every 12 hours. 180 tablet 1    lipase-protease-amylase (Creon) 24,000-76,000 -120,000 unit capsule TAKE UP TO 3 CAPSULES BY MOUTH THREE TIMES A DAY WITH MEALS AND 2 CAPSULES WITH SNACKS. 1440 capsule 2    pen needle, diabetic (BD Ultra-Fine Mini Pen Needle) 31 gauge x 3/16\" needle USE DAILY FOR INSULIN INJECTIONS 100 each 1    SITagliptin phosphate (Januvia) 50 mg tablet Take 1 tablet (50 mg) by mouth once daily. 90 tablet 1    sodium bicarbonate 650 mg tablet Take 2 tablets (1,300 mg) by mouth 2 times a day. 360 tablet 3    tadalafil (Cialis) 10 mg tablet Take 1 tablet (10 mg) by mouth once daily as needed for erectile dysfunction. 12 tablet 2    [DISCONTINUED] amLODIPine (Norvasc) 10 mg tablet Take 1 tablet (10 mg) by mouth once daily. 90 tablet 1    [DISCONTINUED] atorvastatin (Lipitor) 40 mg tablet Take 1 tablet (40 mg) by mouth once daily. 90 tablet 1    [DISCONTINUED] clopidogrel (Plavix) 75 mg tablet Take 1 tablet (75 mg) by mouth once daily. (Patient not taking: Reported on 1/23/2025) 90 tablet 1    [DISCONTINUED] folic acid (Folvite) 1 mg tablet Take 1 tablet (1 mg) by mouth once daily. 30 tablet 6    [DISCONTINUED] metoprolol succinate XL (Toprol-XL) 50 mg 24 hr tablet Take 1 tablet (50 mg) by mouth once " "daily. 90 tablet 1    [DISCONTINUED] sodium,potassium,mag sulfates (Suprep Bowel Prep Kit) 17.5-3.13-1.6 gram recon soln solution Take 1 bottle by mouth every 12 hours. (Patient not taking: Reported on 1/23/2025) 354 mL 0     No current facility-administered medications on file prior to visit.       Medications and Supplements  prescribed by me and other practitioners or clinical pharmacist (such as prescriptions, OTC's, herbal therapies and supplements) were reviewed and documented in the medical record.    Tobacco/Alcohol/Opioid use, as well as Illicit Drug Use was screened for/reviewed and documented in Social History section and medication list as appropriate  Activities of Daily Living  In your present state of health, do you have any difficulty performing the following activities?:   Preparing food and eating?: No  Bathing yourself: No  Getting dressed: No  Using the toilet:No  Moving around from place to place: No  In the past year have you fallen or had a near fall?:No    Depression Screen  (Note: if answer to either of the following is \"Yes\", then a more complete depression screening is indicated)   Q1: Over the past two weeks, have you felt down, depressed or hopeless? No  Q2: Over the past two weeks, have you felt little interest or pleasure in doing things? No    Current exercise habits: The patient does not participate in regular exercise at present.   Dietary issues discussed: Yes  Hearing difficulties: No  Safe in current home environment: yes  Visual Acuity assessed: yes  Cognitive Impairment assessed: yes       Advance directives  Advanced Care Planning (including a Living Will, Healthcare POA, as well as specific end of life choices and/or directives), was discussed for approximately 16 minutes with the patient and/or surrogate, voluntarily, and documented in the medical record.     Cardiac Risk Assessment  Cardiovascular risk was discussed and, if needed, lifestyle modifications recommended, " including nutritional choices, exercise, and elimination of habits contributing to risk. We agreed on a plan to reduce the current cardiovascular risk based on above discussion as needed.  Aspirin use/disuse was discussed after reviewing the updated guidelines below:    Consider low dose Aspirin ( mg) use if the benefit for cardiovascular disease prevention outweighs risk for bleeding complications.   In general, low dose ASA should be considered:  In patients WITHOUT prior MI/stroke/PAD (primary prevention):   a. Age <60: Use if 10-year cardiovascular disease risk >20%, with discussion of risks and benefits with patient  b. Age 60-<70: Use if 10-year cardiovascular disease risk >20% and low bleeding (e.g., gastrointenstinal) risk, with discussion of risks and benefits with patient  c. Age >=70: Do not use    In patients WITH prior MI/stroke/PAD (secondary prevention):   Generally use unless extremely high bleeding (e.g., gastrointenstinal) risk, with discussion of risks and benefits with patient    ROS otherwise negative aside from what was mentioned above in HPI.    Vitals  Vitals:    02/19/25 1103   BP: 132/82     Body mass index is 23.18 kg/m².    Assessment and Plan:  Problem List Items Addressed This Visit       Essential hypertension    Chronic obstructive pulmonary disease (Multi)    Current Assessment & Plan     Avoid smoking tobacco         CKD (chronic kidney disease)    Relevant Medications    folic acid (Folvite) 1 mg tablet    Type 2 diabetes mellitus - Primary    Relevant Orders    Hemoglobin A1C    Hyperlipidemia    Relevant Medications    atorvastatin (Lipitor) 40 mg tablet    Pulmonary HTN (Multi)    Overview     Formatting of this note might be different from the original. Mild per echo 2016         Current Assessment & Plan     Call and follow-up with cardiology         Aortoiliac occlusive disease (Multi)    Current Assessment & Plan     Pulm follow-up with cardiology         Acute on  chronic pancreatitis (Multi)    Current Assessment & Plan     Resolved avoid alcohol         Chronic renal disease, stage IV (Multi)    Current Assessment & Plan     Call and follow-up with nephrology          Other Visit Diagnoses       Other iron deficiency anemia        Relevant Medications    folic acid (Folvite) 1 mg tablet    Hypertension, unspecified type        Relevant Medications    metoprolol succinate XL (Toprol-XL) 50 mg 24 hr tablet    amLODIPine (Norvasc) 10 mg tablet                During the course of the visit the patient was educated and counseled about age appropriate screening and preventive services. Completed preventive screenings were documented in the chart and orders were placed for outstanding screenings/procedures as documented in the Assessment and Plan.      Patient Instructions (the written plan) was given to the patient at check out.      Tim Hoffman DO      Active Problem List      Comprehensive Medical/Surgical/Social/Family History  Past Medical History:   Diagnosis Date    Abdominal distension (gaseous) 07/16/2019    Bloating    Change in bowel habit 07/16/2019    Change in bowel habits    Elevated plasma metanephrines 05/19/2016    Encounter for other preprocedural examination     Pre-op testing    Generalized abdominal pain 07/05/2019    Generalized abdominal pain    Hypertensive urgency 11/26/2022    Irritable bowel syndrome with constipation 07/02/2019    Irritable bowel syndrome with constipation    Leukocytosis 08/16/2023    Other fecal abnormalities 06/27/2019    Change in stool    Other specified diseases of intestine 09/05/2019    Small intestinal bacterial overgrowth    Other specified postprocedural states     H/O colonoscopy    Personal history of other diseases of the digestive system 05/31/2019    History of chronic constipation    S/P aortobifemoral bypass surgery 03/03/2016    Secondary diabetes (Multi) 08/16/2023    Small intestinal bacterial overgrowth  "08/16/2023    Tobacco abuse counseling 02/27/2019    Tobacco abuse counseling    Tobacco use 05/06/2020    Continuous tobacco abuse     Past Surgical History:   Procedure Laterality Date    CT ANGIO NECK  3/24/2017    CT NECK ANGIO W AND WO IV CONTRAST 3/24/2017 [facility]    OTHER SURGICAL HISTORY  07/05/2019    Abdominal Aortic Aneurysm Repair For Dilation Or Occlusion     Social History     Social History Narrative    Not on file         Allergies and Medications  Patient has no known allergies.  Current Outpatient Medications on File Prior to Visit   Medication Sig Dispense Refill    ammonium lactate (Amlactin) 12 % cream Apply topically if needed for dry skin. 60 g 2    Basaglar KwikPen U-100 Insulin 100 unit/mL (3 mL) pen INJECT 20 UNITS UNDER THE SKIN ONCE DAILY AT BEDTIME. 15 mL 2    blood sugar diagnostic (Contour Next Test Strips) strip test once daily 100 strip 1    calcitriol (Rocaltrol) 0.25 mcg capsule Take 1 capsule (0.25 mcg) by mouth once daily. 90 capsule 1    Creon 24,000-76,000 -120,000 unit capsule Take by mouth.      hydrALAZINE (Apresoline) 10 mg tablet Take 1 tablet (10 mg) by mouth every 12 hours. 180 tablet 1    lipase-protease-amylase (Creon) 24,000-76,000 -120,000 unit capsule TAKE UP TO 3 CAPSULES BY MOUTH THREE TIMES A DAY WITH MEALS AND 2 CAPSULES WITH SNACKS. 1440 capsule 2    pen needle, diabetic (BD Ultra-Fine Mini Pen Needle) 31 gauge x 3/16\" needle USE DAILY FOR INSULIN INJECTIONS 100 each 1    SITagliptin phosphate (Januvia) 50 mg tablet Take 1 tablet (50 mg) by mouth once daily. 90 tablet 1    sodium bicarbonate 650 mg tablet Take 2 tablets (1,300 mg) by mouth 2 times a day. 360 tablet 3    tadalafil (Cialis) 10 mg tablet Take 1 tablet (10 mg) by mouth once daily as needed for erectile dysfunction. 12 tablet 2    [DISCONTINUED] amLODIPine (Norvasc) 10 mg tablet Take 1 tablet (10 mg) by mouth once daily. 90 tablet 1    [DISCONTINUED] atorvastatin (Lipitor) 40 mg tablet " "Take 1 tablet (40 mg) by mouth once daily. 90 tablet 1    [DISCONTINUED] clopidogrel (Plavix) 75 mg tablet Take 1 tablet (75 mg) by mouth once daily. (Patient not taking: Reported on 1/23/2025) 90 tablet 1    [DISCONTINUED] folic acid (Folvite) 1 mg tablet Take 1 tablet (1 mg) by mouth once daily. 30 tablet 6    [DISCONTINUED] metoprolol succinate XL (Toprol-XL) 50 mg 24 hr tablet Take 1 tablet (50 mg) by mouth once daily. 90 tablet 1    [DISCONTINUED] sodium,potassium,mag sulfates (Suprep Bowel Prep Kit) 17.5-3.13-1.6 gram recon soln solution Take 1 bottle by mouth every 12 hours. (Patient not taking: Reported on 1/23/2025) 354 mL 0     No current facility-administered medications on file prior to visit.       Medications and Supplements  prescribed by me and other practitioners or clinical pharmacist (such as prescriptions, OTC's, herbal therapies and supplements) were reviewed and documented in the medical record.    Tobacco/Alcohol/Opioid use, as well as Illicit Drug Use was screened for/reviewed and documented in Social History section and medication list as appropriate  Activities of Daily Living  In your present state of health, do you have any difficulty performing the following activities?:   Preparing food and eating?: No  Bathing yourself: No  Getting dressed: No  Using the toilet:No  Moving around from place to place: No  In the past year have you fallen or had a near fall?:No    Depression Screen  (Note: if answer to either of the following is \"Yes\", then a more complete depression screening is indicated)   Q1: Over the past two weeks, have you felt down, depressed or hopeless? No  Q2: Over the past two weeks, have you felt little interest or pleasure in doing things? No    Current exercise habits: The patient does not participate in regular exercise at present.   Dietary issues discussed: Yes  Hearing difficulties: no  Safe in current home environment: yes  Visual Acuity assessed: no  Cognitive " Impairment assessed: no       Advance directives  Advanced Care Planning (including a Living Will, Healthcare POA, as well as specific end of life choices and/or directives), was discussed for approximately 16 minutes with the patient and/or surrogate, voluntarily, and documented in the medical record.     Cardiac Risk Assessment  Cardiovascular risk was discussed and, if needed, lifestyle modifications recommended, including nutritional choices, exercise, and elimination of habits contributing to risk. We agreed on a plan to reduce the current cardiovascular risk based on above discussion as needed.  Aspirin use/disuse was discussed after reviewing the updated guidelines below:    Consider low dose Aspirin ( mg) use if the benefit for cardiovascular disease prevention outweighs risk for bleeding complications.   In general, low dose ASA should be considered:  In patients WITHOUT prior MI/stroke/PAD (primary prevention):   a. Age <60: Use if 10-year cardiovascular disease risk >20%, with discussion of risks and benefits with patient  b. Age 60-<70: Use if 10-year cardiovascular disease risk >20% and low bleeding (e.g., gastrointenstinal) risk, with discussion of risks and benefits with patient  c. Age >=70: Do not use    In patients WITH prior MI/stroke/PAD (secondary prevention):   Generally use unless extremely high bleeding (e.g., gastrointenstinal) risk, with discussion of risks and benefits with patient    ROS otherwise negative aside from what was mentioned above in HPI.  Health Maintenance  Colonoscopy: 2024 due 2027  Mammogram: []  Pap smear/Pelvic Exam: []  DEXA: []  Low dose chest CT: []   Screening Abdominal US: []  Opthalmology: []  Podiatry: []    Immunizations  Influenza: []  Pneumovax: []  Prevnar 13: []  Td/Tdap: []  Zostavax: []            ROS:  Constitutional:  [] denies fever/night sweats/weight loss/fatigue/insomnia  Head: [] denies trauma/headache/masses  Eyes: [] denies loss of  vision/blurry vision/diplopia/redness/eye pain  Ears: [] denies hearing loss/tinnitus/masses/pain/otorrhea  Nose: [] denies anosmia/masses/epistaxis/drainage  Throat: [] denies dysphagia/odynophagia  Neck: [] denies masses/asymmetry  Lymphatics: [] denies lymph node swelling  Cardiovascular: [] denies CP/orthopnea/PND/leg edema/palpitations  Pulmonary: [] denies SOB/dyspnea with exertion/cough/sputum production/hemoptysis/wheezing  GI: [] denies abdominal pain/nausea/vomiting/dysphagia/odynophagia/melena/hematochezia/diarrhea/constipation/change in stool caliber/bowel incontinence  : [] denies dysuria/hematuria/increased frequency/nocturia/dribbling/urinary incontinence  Genital: [] Erectile dysfunction better with Cialis denies discharge/masses/lesions  Musculoskeletal: [] denies trauma/arthralgia/myalgia/deformity/joint swelling  Hematologic: [] denies easy bruising or bleeding  Neurologic: [] denies gait imbalance/paresthesias/saddle paresthesia/focal weakness/dysarthria/seizure  Skin: [] denies masses/lesions/rashes/tattoos  Psychiatric: [] denies depression/suicidal or homicidal thoughts    Vitals  Vitals:    02/19/25 1103   BP: 132/82     Body mass index is 23.18 kg/m².  Physical Exam  Gen: Alert, NAD  HEENT:  PERRLA, EOMI, conjunctiva and sclera normal in appearance. External auditory canals/TMs normal; Oral cavity and posterior pharynx without lesions/exudate  Neck:  Supple with FROM; No masses/nodes palpable; Thyroid nontender and without nodules; No MARICARMEN  Respiratory:  Lungs CTAB  Cardiovascular:  Heart RRR. No M/R/G. Peripheral pulses equal bilaterally  Abdomen:  Soft, nontender, BS present throughout; No R/G/R; No HSM or masses palpated  Extremities: 2+ palpable DP PT bilateral lower extremities FROM all extremities; Muscle strength grossly normal with good tone  Neuro:  CN II-XII intact; Reflexes 2+/2+; Gross motor and sensory intact  Skin: no suspicious lesions present or rashes  Breast: No masses,  skin lesions or nipple discharges, no axillary lymphadenopathy      Patient states his glucose is labile sometimes gets low readings under 70 sometimes very high readings therefore I advised him to follow-up with endocrinology given hypoglycemia intermixed    Patient refused low-dose chest CT of the lungs advised her the chance of lung cancer he understands the risk  Assessment and Plan:  Problem List Items Addressed This Visit       Essential hypertension    Chronic obstructive pulmonary disease (Multi)    Current Assessment & Plan     Avoid smoking tobacco         CKD (chronic kidney disease)    Relevant Medications    folic acid (Folvite) 1 mg tablet    Type 2 diabetes mellitus - Primary    Relevant Orders    Hemoglobin A1C    Hyperlipidemia    Relevant Medications    atorvastatin (Lipitor) 40 mg tablet    Pulmonary HTN (Multi)    Overview     Formatting of this note might be different from the original. Mild per echo 2016         Current Assessment & Plan     Call and follow-up with cardiology         Aortoiliac occlusive disease (Multi)    Current Assessment & Plan     Pulm follow-up with cardiology         Acute on chronic pancreatitis (Multi)    Current Assessment & Plan     Resolved avoid alcohol         Chronic renal disease, stage IV (Multi)    Current Assessment & Plan     Call and follow-up with nephrology          Other Visit Diagnoses       Other iron deficiency anemia        Relevant Medications    folic acid (Folvite) 1 mg tablet    Hypertension, unspecified type        Relevant Medications    metoprolol succinate XL (Toprol-XL) 50 mg 24 hr tablet    amLODIPine (Norvasc) 10 mg tablet          Tobacco cessation is prudent      Home blood pressure check goal less than 130/80 keep a log call if greater    Call and follow-up with ophthalmology yearly exam      Call follow-up with hematology recommendations noted folate 1 mg a day    Call and follow-up with cardiology as planned follow-up in 6  "months    Ophthalmology recommendations noted follow-up in 12 months    Follow-up with nephrology call recommendations noted lab work possible biopsy bicarb supplement calcitriol    Call and follow-up with colonoscopy as ordered as he stated in September    Check A1c today and lab work from nephrology  Screening blood work due August 2025      Thank you for making appointment today Flip    Please follow-up 6 months    Tim Hoffman DO, FA[facility]RIC[facility]        During the course of the visit the patient was educated and counseled about age appropriate screening and preventive services. Completed preventive screenings were documented in the chart and orders were placed for outstanding screenings/procedures as documented in the Assessment and Plan.      Patient Instructions (the written plan) was given to the patient at check out.      Tim Hoffman DO        Health Maintenance:      Colonoscopy:      Mammogram:      Pelvic/Pap:      Low dose chest CT:      Aorta duplex:      Optho:      Podiatry:        Vaccines:      Refer to Epic Vaccination Log                    Objective   /82   Ht 1.753 m (5' 9\")   Wt 71.2 kg (157 lb)   BMI 23.18 kg/m²      No results found for: \"BMPR1A\", \"CBCDIF\"      Assessment/Plan   Diagnoses and all orders for this visit:  Type 2 diabetes mellitus without complication, unspecified whether long term insulin use (Multi)  -     Hemoglobin A1C; Future  Stage 3b chronic kidney disease (Multi)  Comments:  Likely secondary to diabetic nephropathy  Orders:  -     folic acid (Folvite) 1 mg tablet; Take 1 tablet (1 mg) by mouth once daily.  Other iron deficiency anemia  -     folic acid (Folvite) 1 mg tablet; Take 1 tablet (1 mg) by mouth once daily.  Hypertension, unspecified type  -     metoprolol succinate XL (Toprol-XL) 50 mg 24 hr tablet; Take 1 tablet (50 mg) by mouth once daily.  -     amLODIPine (Norvasc) 10 mg tablet; Take 1 tablet (10 mg) by mouth once daily.  Hyperlipidemia, unspecified " hyperlipidemia type  -     atorvastatin (Lipitor) 40 mg tablet; Take 1 tablet (40 mg) by mouth once daily.  Essential hypertension  Chronic kidney disease (CKD), stage IV (severe) (Multi)  Aortoiliac occlusive disease (Multi)  Pulmonary HTN (Multi)  Acute on chronic pancreatitis (Multi)  Pulmonary emphysema, unspecified emphysema type (Multi)           Ulices Cabral and med refill

## 2025-03-04 ENCOUNTER — SPECIALTY PHARMACY (OUTPATIENT)
Dept: PHARMACY | Facility: CLINIC | Age: 70
End: 2025-03-04

## 2025-03-05 DIAGNOSIS — E11.9 TYPE 2 DIABETES MELLITUS WITHOUT COMPLICATION, UNSPECIFIED WHETHER LONG TERM INSULIN USE (MULTI): Primary | ICD-10-CM

## 2025-03-11 ENCOUNTER — SPECIALTY PHARMACY (OUTPATIENT)
Dept: PHARMACY | Facility: CLINIC | Age: 70
End: 2025-03-11

## 2025-03-11 ENCOUNTER — APPOINTMENT (OUTPATIENT)
Dept: PHARMACY | Facility: HOSPITAL | Age: 70
End: 2025-03-11
Payer: MEDICARE

## 2025-03-11 DIAGNOSIS — E11.9 TYPE 2 DIABETES MELLITUS WITHOUT COMPLICATION, UNSPECIFIED WHETHER LONG TERM INSULIN USE (MULTI): ICD-10-CM

## 2025-03-11 NOTE — PROGRESS NOTES
Clinical Pharmacy Appointment  Subjective     Patient ID: Flip Murphy is a 69 y.o. male who presents for Diabetes.    Referring Provider: Tim Hoffman,      DIABETES MELLITUS TYPE 2:    Does patient follow with Endocrinology: No  Last optometry exam: Appointment scheduled  Most recent visit in Podiatry: PCP     Diet:   Breakfast: no breakfast  Lunch: Sandwiches and fries  Dinner: [employer], other take out type items, tv dinner, Rice, Soup    Exercise: Infrequently    No Known Allergies    Objective     Current DM Pharmacotherapy:     Januvia 50 mg once daily  Basaglar 20 units nightly     Clarifications to above regimen: Last dose of Januvia today (insurance may no longer cover)  Adverse Effects: not at this time    SECONDARY PREVENTION  - Statin? Yes, atorvastatin 40 mg daily  - ACEi/ARB? No, DC, now on amlodipine and hydralazine  - Aspirin? No    Current monitoring regimen:   Patient is using: glucometer    Testing frequency: once daily after work    SMBG Readings: Will start recording FBG and occasional PPBG    Any episodes of hypoglycemia? No.  Did patient treat episode of hypoglycemia appropriately? N/A    Pertinent PMH Review:  - PMH of Pancreatitis: Yes  - PMH/FH of Medullary Thyroid Cancer: No  - PMH/FH of Multiple Endocrine Neoplasia (MEN) Type II: No  - PMH of Retinopathy: Follows with ophth for cataract and macular degeneration  - PMH of Urinary Tract Infections/Yeast Infections: No    Lab Review  BMP  Lab Results   Component Value Date    CALCIUM 7.6 (L) 01/23/2025     01/23/2025    K 5.1 01/23/2025    CO2 15 (L) 01/23/2025     (H) 01/23/2025    BUN 50 (H) 01/23/2025    CREATININE 3.94 (H) 01/23/2025    EGFR 16 (L) 01/23/2025     LFTs  Lab Results   Component Value Date    ALT 41 11/07/2024    AST 33 11/07/2024    ALKPHOS 121 11/07/2024    BILITOT 0.3 11/07/2024     FLP  Lab Results   Component Value Date    TRIG 88 01/23/2025    CHOL 126 01/23/2025    LDLF 62 08/17/2023    LDLCALC 76  01/23/2025    HDL 32.2 01/23/2025       The ASCVD Risk score (Willy PHILLIPS, et al., 2019) failed to calculate for the following reasons:    The valid total cholesterol range is 130 to 320 mg/dL  Urine Microalbumin  Lab Results   Component Value Date    MICROALBCREA 1,827.5 (H) 01/09/2020     Albumin/Creatine Ratio   Date Value Ref Range Status   01/09/2020 1,827.5 (H) 0.0 - 30.0 ug/mg crt Final   11/27/2018 1,187.2 (H) 0.0 - 30.0 ug/mg crt Final   12/14/2017 667.7 (H) 0.0 - 30.0 ug/mg crt Final     Weight Management  Wt Readings from Last 3 Encounters:   02/19/25 71.2 kg (157 lb)   01/23/25 77.6 kg (171 lb)   11/07/24 69.7 kg (153 lb 9.6 oz)      There is no height or weight on file to calculate BMI.   A1C  Lab Results   Component Value Date    HGBA1C 7.6 (H) 02/19/2025    HGBA1C 7.1 (H) 08/21/2024    HGBA1C 7.9 (H) 02/28/2024         Assessment/Plan     Problem List Items Addressed This Visit       Type 2 diabetes mellitus     Rationale: Need to establish baseline BG readings prior to any changes. In future will evaluate additional medications and januvia replacement if necessary. Patient needs most cost effective options and does not qualify for  PAP.    Take daily FBG and 2-3 PPBG for 1 week  Follow up in 1 week to discuss BG readings and medication adjustments    Kalin Lopez, PharmD  Clinical Pharmacy Specialist, Primary Care     Continue all meds under the continuation of care with the referring provider and clinical pharmacy team

## 2025-03-18 DIAGNOSIS — E11.9 TYPE 2 DIABETES MELLITUS WITHOUT COMPLICATION, WITHOUT LONG-TERM CURRENT USE OF INSULIN (MULTI): ICD-10-CM

## 2025-03-18 RX ORDER — SITAGLIPTIN 50 MG/1
50 TABLET, FILM COATED ORAL DAILY
Qty: 90 TABLET | Refills: 1 | Status: SHIPPED | OUTPATIENT
Start: 2025-03-18

## 2025-03-19 ENCOUNTER — APPOINTMENT (OUTPATIENT)
Dept: PHARMACY | Facility: HOSPITAL | Age: 70
End: 2025-03-19
Payer: MEDICARE

## 2025-03-19 DIAGNOSIS — E11.9 TYPE 2 DIABETES MELLITUS WITHOUT COMPLICATION, WITHOUT LONG-TERM CURRENT USE OF INSULIN (MULTI): ICD-10-CM

## 2025-03-19 DIAGNOSIS — E11.9 TYPE 2 DIABETES MELLITUS WITHOUT COMPLICATION, UNSPECIFIED WHETHER LONG TERM INSULIN USE (MULTI): ICD-10-CM

## 2025-03-19 RX ORDER — LANCETS
EACH MISCELLANEOUS
Qty: 200 EACH | Refills: 1 | Status: SHIPPED | OUTPATIENT
Start: 2025-03-19

## 2025-03-19 RX ORDER — BLOOD SUGAR DIAGNOSTIC
STRIP MISCELLANEOUS
Qty: 200 STRIP | Refills: 1 | Status: SHIPPED | OUTPATIENT
Start: 2025-03-19

## 2025-03-19 NOTE — PROGRESS NOTES
Clinical Pharmacy Appointment  Subjective     Patient ID: Flip Murphy is a 69 y.o. male who presents for No chief complaint on file..    Referring Provider: Tim Hoffman,      DIABETES MELLITUS TYPE 2:    Does patient follow with Endocrinology: No  Last optometry exam: Appointment scheduled  Most recent visit in Podiatry: PCP     Diet: No change  Breakfast: no breakfast  Lunch: Sandwiches and fries  Dinner: [employer], other take out type items, tv dinner, Rice, Soup    Exercise: Infrequently    No Known Allergies    Objective     Current DM Pharmacotherapy:     Januvia 50 mg once daily (re-started 3/18 30 day supply)  Basaglar 20 units nightly     Clarifications to above regimen: picked up 30 day supply yesterday  Adverse events: not at this time    SECONDARY PREVENTION  - Statin? Yes, atorvastatin 40 mg daily  - ACEi/ARB? No, DC, now on amlodipine and hydralazine  - Aspirin? No    Current monitoring regimen:   Patient is using: glucometer    Testing frequency: once daily after work    SMBG Readings:   FB (did not eat much at all day before), 126, 146     Any episodes of hypoglycemia? Yes.  Did patient treat episode of hypoglycemia appropriately? Yes, Rule of 15 re-education needed    Pertinent PMH Review:  - PMH of Pancreatitis: Yes  - PMH/FH of Medullary Thyroid Cancer: No  - PMH/FH of Multiple Endocrine Neoplasia (MEN) Type II: No  - PMH of Retinopathy: Follows with ophth for cataract and macular degeneration  - PMH of Urinary Tract Infections/Yeast Infections: No    Lab Review  BMP  Lab Results   Component Value Date    CALCIUM 7.6 (L) 2025     2025    K 5.1 2025    CO2 15 (L) 2025     (H) 2025    BUN 50 (H) 2025    CREATININE 3.94 (H) 2025    EGFR 16 (L) 2025     LFTs  Lab Results   Component Value Date    ALT 41 2024    AST 33 2024    ALKPHOS 121 2024    BILITOT 0.3 2024     FLP  Lab Results   Component Value Date     TRIG 88 01/23/2025    CHOL 126 01/23/2025    LDLF 62 08/17/2023    LDLCALC 76 01/23/2025    HDL 32.2 01/23/2025       The ASCVD Risk score (Willy PHILLIPS, et al., 2019) failed to calculate for the following reasons:    The valid total cholesterol range is 130 to 320 mg/dL  Urine Microalbumin  Lab Results   Component Value Date    MICROALBCREA 1,827.5 (H) 01/09/2020     Albumin/Creatine Ratio   Date Value Ref Range Status   01/09/2020 1,827.5 (H) 0.0 - 30.0 ug/mg crt Final   11/27/2018 1,187.2 (H) 0.0 - 30.0 ug/mg crt Final   12/14/2017 667.7 (H) 0.0 - 30.0 ug/mg crt Final     Weight Management  Wt Readings from Last 3 Encounters:   02/19/25 71.2 kg (157 lb)   01/23/25 77.6 kg (171 lb)   11/07/24 69.7 kg (153 lb 9.6 oz)      There is no height or weight on file to calculate BMI.   A1C  Lab Results   Component Value Date    HGBA1C 7.6 (H) 02/19/2025    HGBA1C 7.1 (H) 08/21/2024    HGBA1C 7.9 (H) 02/28/2024         Assessment/Plan     Problem List Items Addressed This Visit       Type 2 diabetes mellitus     Rationale: Patient dropped into 50s one morning after not eating much day before. Will lower basaglar and further encourages patient to test FBG every morning and PPBG 2-3 times weekly.     Future considerations: patient has history of pancreatitis, has been tolerating januvia with no issues. Kidney function limits alternative options. Patient in process of signing up for medicare prescription payment plan and will most likely hit max out of pocket cost for year.   May benefit from trial of GLP1ra if tolerating DPP4 for increased cardio/renal protective effects and A1C lowering.    Decrease Basaglar from 20 units to 15 units nightly  Continue Sitagliptin 50 mg once daily  Take daily FBG and 2-3 PPBG for 1 week  Follow up in 1 week to discuss BG readings and medication adjustments    Kalin Lopez, PharmD  Clinical Pharmacy Specialist, Primary Care     Continue all meds under the continuation of care with the  referring provider and clinical pharmacy team

## 2025-03-21 ENCOUNTER — SPECIALTY PHARMACY (OUTPATIENT)
Dept: PHARMACY | Facility: CLINIC | Age: 70
End: 2025-03-21

## 2025-03-26 ENCOUNTER — APPOINTMENT (OUTPATIENT)
Dept: PHARMACY | Facility: HOSPITAL | Age: 70
End: 2025-03-26
Payer: MEDICARE

## 2025-03-26 ENCOUNTER — SPECIALTY PHARMACY (OUTPATIENT)
Dept: PHARMACY | Facility: CLINIC | Age: 70
End: 2025-03-26

## 2025-03-26 DIAGNOSIS — E11.9 TYPE 2 DIABETES MELLITUS WITHOUT COMPLICATION, UNSPECIFIED WHETHER LONG TERM INSULIN USE: ICD-10-CM

## 2025-03-26 PROCEDURE — RXMED WILLOW AMBULATORY MEDICATION CHARGE

## 2025-03-26 NOTE — PROGRESS NOTES
Clinical Pharmacy Appointment  Subjective     Patient ID: Flip Murphy is a 69 y.o. male who presents for No chief complaint on file..    Referring Provider: Tim Hoffman,      DIABETES MELLITUS TYPE 2:    Does patient follow with Endocrinology: No  Last optometry exam: Appointment scheduled  Most recent visit in Podiatry: PCP     Diet: No change  Breakfast: no breakfast  Lunch: Sandwiches and fries  Dinner: [employer], other take out type items, tv dinner, Rice, Soup    Exercise: Infrequently    No Known Allergies    Objective     Current DM Pharmacotherapy:     Januvia 50 mg once daily (re-started 3/18 30 day supply)  Basaglar 15 units nightly     Clarifications to above regimen: none  Adverse events: not at this time    SECONDARY PREVENTION  - Statin? Yes, atorvastatin 40 mg daily  - ACEi/ARB? No, DC, now on amlodipine and hydralazine  - Aspirin? No    Current monitoring regimen:   Patient is using: glucometer    Testing frequency: FBG every morning and once nightly     SMBG Readings:   FB, 88, 114, 117, 119, 104  PPB, 200, 137, 213, 171, 172    Any episodes of hypoglycemia? Yes.  Did patient treat episode of hypoglycemia appropriately? Yes, Rule of 15 re-education needed    Pertinent PMH Review:  - PMH of Pancreatitis: Yes  - PMH/FH of Medullary Thyroid Cancer: No  - PMH/FH of Multiple Endocrine Neoplasia (MEN) Type II: No  - PMH of Retinopathy: Follows with ophth for cataract and macular degeneration  - PMH of Urinary Tract Infections/Yeast Infections: No    Lab Review  BMP  Lab Results   Component Value Date    CALCIUM 7.6 (L) 2025     2025    K 5.1 2025    CO2 15 (L) 2025     (H) 2025    BUN 50 (H) 2025    CREATININE 3.94 (H) 2025    EGFR 16 (L) 2025     LFTs  Lab Results   Component Value Date    ALT 41 2024    AST 33 2024    ALKPHOS 121 2024    BILITOT 0.3 2024     FLP  Lab Results   Component Value Date     TRIG 88 01/23/2025    CHOL 126 01/23/2025    LDLF 62 08/17/2023    LDLCALC 76 01/23/2025    HDL 32.2 01/23/2025       The ASCVD Risk score (Willy PHILLIPS, et al., 2019) failed to calculate for the following reasons:    The valid total cholesterol range is 130 to 320 mg/dL  Urine Microalbumin  Lab Results   Component Value Date    MICROALBCREA 1,827.5 (H) 01/09/2020     Albumin/Creatine Ratio   Date Value Ref Range Status   01/09/2020 1,827.5 (H) 0.0 - 30.0 ug/mg crt Final   11/27/2018 1,187.2 (H) 0.0 - 30.0 ug/mg crt Final   12/14/2017 667.7 (H) 0.0 - 30.0 ug/mg crt Final     Weight Management  Wt Readings from Last 3 Encounters:   02/19/25 71.2 kg (157 lb)   01/23/25 77.6 kg (171 lb)   11/07/24 69.7 kg (153 lb 9.6 oz)      There is no height or weight on file to calculate BMI.   A1C  Lab Results   Component Value Date    HGBA1C 7.6 (H) 02/19/2025    HGBA1C 7.1 (H) 08/21/2024    HGBA1C 7.9 (H) 02/28/2024         Assessment/Plan     Problem List Items Addressed This Visit       Type 2 diabetes mellitus     Rationale: Patient low FBG have been eliminated by decreasing Basaglar to 15 units. BG trending above goal by end of day. Need additional coverage. Patient has history of pancreatitis but has not any issues on januvia. Will discontinue januvia and start trial of ozempic for additional BG coverage, renal/cardio protective impact.    Future considerations: split dosing basaglar, meal time insulin    CONTINUE Basaglar 15 units nightly  STOP Januvia 50 mg daily  START Ozempic 0.25 mg once weekly  Take daily FBG and 2-3 PPBG for 1 week  Follow up in 1 week to discuss BG readings and medication adjustments    Ozempic Education:     - Counseled patient on Ozempic MOA, expectations, side effects, duration of therapy, administration, and monitoring parameters.  - Provided detailed dosing and administration counseling to ensure proper technique.   - Reviewed Ozempic titration schedule, starting with 0.25 mg once weekly for 4  weeks, then continuing on 0.5 mg once weekly. Pt verbalized understanding.  - Counseled patient on the benefits of GLP-1ra, such as cardiovascular risk reduction, glycemic control, and weight loss potential.  - Reviewed storage requirements of Ozempic when not in use, and when to administer the medication if a dose is missed.  - Advised patient that they may experience improved satiety after meals and portion sizes of meals may be reduced as doses of Ozempic increase.  - Counseled patient to avoid foods that are fatty/oily as this may precipitate the nausea/GI upset that may occur with new start Ozempic.     Monitor for signs and symptoms of pancreatitis:  Pain/tenderness in upper abdomen +/- radiating to back  Fever  Increase heart rate  Upset stomach/vomiting     Kalin Lopez, PharmD  Clinical Pharmacy Specialist, Primary Care     Continue all meds under the continuation of care with the referring provider and clinical pharmacy team

## 2025-03-29 ENCOUNTER — PHARMACY VISIT (OUTPATIENT)
Dept: PHARMACY | Facility: CLINIC | Age: 70
End: 2025-03-29
Payer: COMMERCIAL

## 2025-04-02 ENCOUNTER — APPOINTMENT (OUTPATIENT)
Dept: PHARMACY | Facility: HOSPITAL | Age: 70
End: 2025-04-02
Payer: MEDICARE

## 2025-04-02 DIAGNOSIS — E11.9 TYPE 2 DIABETES MELLITUS WITHOUT COMPLICATION, UNSPECIFIED WHETHER LONG TERM INSULIN USE: ICD-10-CM

## 2025-04-02 NOTE — PROGRESS NOTES
Clinical Pharmacy Appointment  Subjective     Patient ID: Flip Murphy is a 69 y.o. male who presents for Diabetes.    Referring Provider: Tim Hoffman,      DIABETES MELLITUS TYPE 2:    Does patient follow with Endocrinology: No  Last optometry exam: Appointment scheduled  Most recent visit in Podiatry: PCP     Diet: No change  Breakfast: no breakfast  Lunch: Sandwiches and fries  Dinner: [employer], other take out type items, tv dinner, Rice, Soup    Exercise: Infrequently    No Known Allergies    Objective     Current DM Pharmacotherapy:     Ozempic 0.25 mg once weekly (started today 25)  Basaglar 15 units nightly     Clarifications to above regimen: stopped januvia today 25  Adverse events: not at this time    SECONDARY PREVENTION  - Statin? Yes, atorvastatin 40 mg daily  - ACEi/ARB? No, DC, now on amlodipine and hydralazine  - Aspirin? No    Current monitoring regimen:   Patient is using: glucometer    Testing frequency: FBG every morning and once nightly     SMBG Readings: not currently on his person  FB-120  PPBG: 160-190 and one reading above 200    Any episodes of hypoglycemia? Yes.  Did patient treat episode of hypoglycemia appropriately? Yes, Rule of 15 re-education needed    Pertinent PMH Review:  - PMH of Pancreatitis: Yes  - PMH/FH of Medullary Thyroid Cancer: No  - PMH/FH of Multiple Endocrine Neoplasia (MEN) Type II: No  - PMH of Retinopathy: Follows with ophth for cataract and macular degeneration  - PMH of Urinary Tract Infections/Yeast Infections: No    Lab Review  BMP  Lab Results   Component Value Date    CALCIUM 7.6 (L) 2025     2025    K 5.1 2025    CO2 15 (L) 2025     (H) 2025    BUN 50 (H) 2025    CREATININE 3.94 (H) 2025    EGFR 16 (L) 2025     LFTs  Lab Results   Component Value Date    ALT 41 2024    AST 33 2024    ALKPHOS 121 2024    BILITOT 0.3 2024     FLP  Lab Results   Component  Value Date    TRIG 88 01/23/2025    CHOL 126 01/23/2025    LDLF 62 08/17/2023    LDLCALC 76 01/23/2025    HDL 32.2 01/23/2025       The ASCVD Risk score (Willy PHILLIPS, et al., 2019) failed to calculate for the following reasons:    The valid total cholesterol range is 130 to 320 mg/dL  Urine Microalbumin  Lab Results   Component Value Date    MICROALBCREA 1,827.5 (H) 01/09/2020     Albumin/Creatine Ratio   Date Value Ref Range Status   01/09/2020 1,827.5 (H) 0.0 - 30.0 ug/mg crt Final   11/27/2018 1,187.2 (H) 0.0 - 30.0 ug/mg crt Final   12/14/2017 667.7 (H) 0.0 - 30.0 ug/mg crt Final     Weight Management  Wt Readings from Last 3 Encounters:   02/19/25 71.2 kg (157 lb)   01/23/25 77.6 kg (171 lb)   11/07/24 69.7 kg (153 lb 9.6 oz)      There is no height or weight on file to calculate BMI.   A1C  Lab Results   Component Value Date    HGBA1C 7.6 (H) 02/19/2025    HGBA1C 7.1 (H) 08/21/2024    HGBA1C 7.9 (H) 02/28/2024         Assessment/Plan     Problem List Items Addressed This Visit       Type 2 diabetes mellitus     Rationale: FBG are running on the higher end of goal but in range. PPBG are seeing elevations above goal. Will increase Basaglar from 15 units to 16 units and evaluate how slight increase in addition to ozempic dose impacts BG readings. Patient will keep daily log of BG readings and contact clinical pharmacist if FBG consistently dropping below 80.     Future considerations: morning basaglar, meal time insulin    Increase Basaglar to 16 units nightly  Continue Ozempic 0.25 mg once weekly  Take daily FBG and 2-3 PPBG for 1 week  Follow up in 1 week to discuss BG readings and medication adjustments    Ozempic Education:     - Counseled patient on Ozempic MOA, expectations, side effects, duration of therapy, administration, and monitoring parameters.  - Provided detailed dosing and administration counseling to ensure proper technique.   - Reviewed Ozempic titration schedule, starting with 0.25 mg once weekly  for 4 weeks, then continuing on 0.5 mg once weekly. Pt verbalized understanding.  - Counseled patient on the benefits of GLP-1ra, such as cardiovascular risk reduction, glycemic control, and weight loss potential.  - Reviewed storage requirements of Ozempic when not in use, and when to administer the medication if a dose is missed.  - Advised patient that they may experience improved satiety after meals and portion sizes of meals may be reduced as doses of Ozempic increase.  - Counseled patient to avoid foods that are fatty/oily as this may precipitate the nausea/GI upset that may occur with new start Ozempic.     Monitor for signs and symptoms of pancreatitis:  Pain/tenderness in upper abdomen +/- radiating to back  Fever  Increase heart rate  Upset stomach/vomiting     Kalin Lopez, PharmD  Clinical Pharmacy Specialist, Primary Care     Continue all meds under the continuation of care with the referring provider and clinical pharmacy team

## 2025-04-03 ENCOUNTER — SPECIALTY PHARMACY (OUTPATIENT)
Dept: PHARMACY | Facility: CLINIC | Age: 70
End: 2025-04-03

## 2025-04-09 ENCOUNTER — APPOINTMENT (OUTPATIENT)
Dept: PHARMACY | Facility: HOSPITAL | Age: 70
End: 2025-04-09
Payer: MEDICARE

## 2025-04-09 DIAGNOSIS — E11.9 TYPE 2 DIABETES MELLITUS WITHOUT COMPLICATION, UNSPECIFIED WHETHER LONG TERM INSULIN USE: ICD-10-CM

## 2025-04-09 NOTE — PROGRESS NOTES
Clinical Pharmacy Appointment  Subjective     Patient ID: Flip Murphy is a 69 y.o. male who presents for Diabetes.    Referring Provider: Tim Hoffman, DO     DIABETES MELLITUS TYPE 2:    Does patient follow with Endocrinology: No  Last optometry exam: Appointment scheduled  Most recent visit in Podiatry: PCP     Diet: No change  Breakfast: no breakfast  Lunch: Sandwiches and fries  Dinner: [employer], other take out type items, tv dinner, Rice, Soup    Exercise: looking to pull out treadmill    No Known Allergies    Objective     Current DM Pharmacotherapy:     Ozempic 0.25 mg once weekly (started today 25)  Basaglar 16 units nightly     Clarifications to above regimen: stopped januvia today 25  Adverse events: not at this time    SECONDARY PREVENTION  - Statin? Yes, atorvastatin 40 mg daily  - ACEi/ARB? No, DC, now on amlodipine and hydralazine  - Aspirin? No    Current monitoring regimen:   Patient is using: glucometer    Testing frequency: FBG every morning and once nightly     SMBG Readings: not currently on his person  FB, 56 (didn't eat night before), 182, 244, 112, 74  PPB, 150, 61, 160, 152, 225    Any episodes of hypoglycemia? Yes.  Did patient treat episode of hypoglycemia appropriately? Yes, Rule of 15 re-education needed    Pertinent PMH Review:  - PMH of Pancreatitis: Yes  - PMH/FH of Medullary Thyroid Cancer: No  - PMH/FH of Multiple Endocrine Neoplasia (MEN) Type II: No  - PMH of Retinopathy: Follows with ophth for cataract and macular degeneration  - PMH of Urinary Tract Infections/Yeast Infections: No    Lab Review  BMP  Lab Results   Component Value Date    CALCIUM 7.6 (L) 2025     2025    K 5.1 2025    CO2 15 (L) 2025     (H) 2025    BUN 50 (H) 2025    CREATININE 3.94 (H) 2025    EGFR 16 (L) 2025     LFTs  Lab Results   Component Value Date    ALT 41 2024    AST 33 2024    ALKPHOS 121 2024     BILITOT 0.3 11/07/2024     FLP  Lab Results   Component Value Date    TRIG 88 01/23/2025    CHOL 126 01/23/2025    LDLF 62 08/17/2023    LDLCALC 76 01/23/2025    HDL 32.2 01/23/2025       The ASCVD Risk score (Willy PHILLIPS, et al., 2019) failed to calculate for the following reasons:    The valid total cholesterol range is 130 to 320 mg/dL  Urine Microalbumin  Lab Results   Component Value Date    MICROALBCREA 1,827.5 (H) 01/09/2020     Albumin/Creatine Ratio   Date Value Ref Range Status   01/09/2020 1,827.5 (H) 0.0 - 30.0 ug/mg crt Final   11/27/2018 1,187.2 (H) 0.0 - 30.0 ug/mg crt Final   12/14/2017 667.7 (H) 0.0 - 30.0 ug/mg crt Final     Weight Management  Wt Readings from Last 3 Encounters:   02/19/25 71.2 kg (157 lb)   01/23/25 77.6 kg (171 lb)   11/07/24 69.7 kg (153 lb 9.6 oz)      There is no height or weight on file to calculate BMI.   A1C  Lab Results   Component Value Date    HGBA1C 7.6 (H) 02/19/2025    HGBA1C 7.1 (H) 08/21/2024    HGBA1C 7.9 (H) 02/28/2024         Assessment/Plan     Problem List Items Addressed This Visit       Type 2 diabetes mellitus     Rationale: Patient having more consistent lows again and is highly variable throughout the day. Will decrease basaglar back to 15 units nightly and have patient monitor BG readings for 2 weeks before titrating up ozempic dose.    Future considerations: morning basaglar, meal time insulin    Decrease Basaglar to 15 units nightly  Continue Ozempic 0.25 mg once weekly  Take daily FBG and 2-3 PPBG for 2 weeks  Follow up in 2 weeks to discuss BG readings and medication adjustments    Ozempic Education:     - Counseled patient on Ozempic MOA, expectations, side effects, duration of therapy, administration, and monitoring parameters.  - Provided detailed dosing and administration counseling to ensure proper technique.   - Reviewed Ozempic titration schedule, starting with 0.25 mg once weekly for 4 weeks, then continuing on 0.5 mg once weekly. Pt verbalized  understanding.  - Counseled patient on the benefits of GLP-1ra, such as cardiovascular risk reduction, glycemic control, and weight loss potential.  - Reviewed storage requirements of Ozempic when not in use, and when to administer the medication if a dose is missed.  - Advised patient that they may experience improved satiety after meals and portion sizes of meals may be reduced as doses of Ozempic increase.  - Counseled patient to avoid foods that are fatty/oily as this may precipitate the nausea/GI upset that may occur with new start Ozempic.     Monitor for signs and symptoms of pancreatitis:  Pain/tenderness in upper abdomen +/- radiating to back  Fever  Increase heart rate  Upset stomach/vomiting     Kalin Lopez, PharmD  Clinical Pharmacy Specialist, Primary Care     Continue all meds under the continuation of care with the referring provider and clinical pharmacy team

## 2025-04-18 ENCOUNTER — SPECIALTY PHARMACY (OUTPATIENT)
Dept: PHARMACY | Facility: CLINIC | Age: 70
End: 2025-04-18

## 2025-04-22 DIAGNOSIS — E11.9 TYPE 2 DIABETES MELLITUS WITHOUT COMPLICATION, WITHOUT LONG-TERM CURRENT USE OF INSULIN: ICD-10-CM

## 2025-04-22 DIAGNOSIS — I10 HYPERTENSION, UNSPECIFIED TYPE: ICD-10-CM

## 2025-04-22 RX ORDER — PEN NEEDLE, DIABETIC 30 GX3/16"
NEEDLE, DISPOSABLE MISCELLANEOUS
Qty: 100 EACH | Refills: 1 | Status: SHIPPED | OUTPATIENT
Start: 2025-04-22

## 2025-04-22 RX ORDER — HYDRALAZINE HYDROCHLORIDE 10 MG/1
TABLET, FILM COATED ORAL
Qty: 180 TABLET | Refills: 1 | Status: SHIPPED | OUTPATIENT
Start: 2025-04-22

## 2025-04-23 ENCOUNTER — APPOINTMENT (OUTPATIENT)
Dept: PHARMACY | Facility: HOSPITAL | Age: 70
End: 2025-04-23
Payer: MEDICARE

## 2025-04-23 DIAGNOSIS — E11.9 TYPE 2 DIABETES MELLITUS WITHOUT COMPLICATION, UNSPECIFIED WHETHER LONG TERM INSULIN USE: ICD-10-CM

## 2025-04-23 NOTE — PROGRESS NOTES
Clinical Pharmacy Appointment  Subjective     Patient ID: Flip Murphy is a 69 y.o. male who presents for Diabetes.    Referring Provider: Tim Hoffman, DO     DIABETES MELLITUS TYPE 2:    Does patient follow with Endocrinology: No  Last optometry exam: Appointment scheduled  Most recent visit in Podiatry: PCP     Diet: No change  Breakfast: no breakfast  Lunch: Sandwiches and fries  Dinner: [employer], other take out type items, tv dinner, Rice, Soup    Exercise: Walking    No Known Allergies    Objective     Current DM Pharmacotherapy:     Ozempic 0.25 mg once weekly (started today 25)  Basaglar 15 units nightly     Clarifications to above regimen: stopped januvia today 25  Adverse events: not at this time    SECONDARY PREVENTION  - Statin? Yes, atorvastatin 40 mg daily  - ACEi/ARB? No, DC, now on amlodipine and hydralazine  - Aspirin? No    Current monitoring regimen:   Patient is using: glucometer    Testing frequency: FBG every morning and once nightly     SMBG Readings: not currently on his person  FB, 101, 127, 122, 57, 85, 94, 60  PPB, 66 (did not eat much skipped insulin), 150, 152, 210, 124, 153    Any episodes of hypoglycemia? Yes.  Did patient treat episode of hypoglycemia appropriately? Yes, Rule of 15 re-education needed    Pertinent PMH Review:  - PMH of Pancreatitis: Yes  - PMH/FH of Medullary Thyroid Cancer: No  - PMH/FH of Multiple Endocrine Neoplasia (MEN) Type II: No  - PMH of Retinopathy: Follows with ophth for cataract and macular degeneration  - PMH of Urinary Tract Infections/Yeast Infections: No    Lab Review  BMP  Lab Results   Component Value Date    CALCIUM 7.6 (L) 2025     2025    K 5.1 2025    CO2 15 (L) 2025     (H) 2025    BUN 50 (H) 2025    CREATININE 3.94 (H) 2025    EGFR 16 (L) 2025     LFTs  Lab Results   Component Value Date    ALT 41 2024    AST 33 2024    ALKPHOS 121 2024     BILITOT 0.3 11/07/2024     FLP  Lab Results   Component Value Date    TRIG 88 01/23/2025    CHOL 126 01/23/2025    LDLF 62 08/17/2023    LDLCALC 76 01/23/2025    HDL 32.2 01/23/2025       The ASCVD Risk score (Willy PHILLIPS, et al., 2019) failed to calculate for the following reasons:    The valid total cholesterol range is 130 to 320 mg/dL  Urine Microalbumin  Lab Results   Component Value Date    MICROALBCREA 1,827.5 (H) 01/09/2020     Albumin/Creatine Ratio   Date Value Ref Range Status   01/09/2020 1,827.5 (H) 0.0 - 30.0 ug/mg crt Final   11/27/2018 1,187.2 (H) 0.0 - 30.0 ug/mg crt Final   12/14/2017 667.7 (H) 0.0 - 30.0 ug/mg crt Final     Weight Management  Wt Readings from Last 3 Encounters:   02/19/25 71.2 kg (157 lb)   01/23/25 77.6 kg (171 lb)   11/07/24 69.7 kg (153 lb 9.6 oz)      There is no height or weight on file to calculate BMI.   A1C  Lab Results   Component Value Date    HGBA1C 7.6 (H) 02/19/2025    HGBA1C 7.1 (H) 08/21/2024    HGBA1C 7.9 (H) 02/28/2024         Assessment/Plan     Problem List Items Addressed This Visit       Type 2 diabetes mellitus     Rationale: Patient having more consistent lows again and is highly variable throughout the day. Will decrease basaglar back to 14 units nightly and have patient monitor BG readings for 1 week before titrating up ozempic dose.    Future considerations: morning basaglar, meal time insulin    Decrease Basaglar to 14 units nightly  Continue Ozempic 0.25 mg once weekly  Take daily FBG and PPBG for 1 week  Follow up in 1 weeks to discuss BG readings and medication adjustments    Ozempic Education:     - Counseled patient on Ozempic MOA, expectations, side effects, duration of therapy, administration, and monitoring parameters.  - Provided detailed dosing and administration counseling to ensure proper technique.   - Reviewed Ozempic titration schedule, starting with 0.25 mg once weekly for 4 weeks, then continuing on 0.5 mg once weekly. Pt verbalized  understanding.  - Counseled patient on the benefits of GLP-1ra, such as cardiovascular risk reduction, glycemic control, and weight loss potential.  - Reviewed storage requirements of Ozempic when not in use, and when to administer the medication if a dose is missed.  - Advised patient that they may experience improved satiety after meals and portion sizes of meals may be reduced as doses of Ozempic increase.  - Counseled patient to avoid foods that are fatty/oily as this may precipitate the nausea/GI upset that may occur with new start Ozempic.     Monitor for signs and symptoms of pancreatitis:  Pain/tenderness in upper abdomen +/- radiating to back  Fever  Increase heart rate  Upset stomach/vomiting     Kalin Lopez, PharmD  Clinical Pharmacy Specialist, Primary Care     Continue all meds under the continuation of care with the referring provider and clinical pharmacy team

## 2025-04-30 ENCOUNTER — APPOINTMENT (OUTPATIENT)
Dept: PHARMACY | Facility: HOSPITAL | Age: 70
End: 2025-04-30
Payer: MEDICARE

## 2025-04-30 ENCOUNTER — SPECIALTY PHARMACY (OUTPATIENT)
Dept: PHARMACY | Facility: CLINIC | Age: 70
End: 2025-04-30

## 2025-04-30 DIAGNOSIS — E11.9 TYPE 2 DIABETES MELLITUS WITHOUT COMPLICATION, UNSPECIFIED WHETHER LONG TERM INSULIN USE: ICD-10-CM

## 2025-04-30 NOTE — PROGRESS NOTES
Clinical Pharmacy Appointment  Subjective     Patient ID: Flip Murphy is a 69 y.o. male who presents for Diabetes.    Referring Provider: Tim Hoffman,      DIABETES MELLITUS TYPE 2:    Does patient follow with Endocrinology: No  Last optometry exam: Appointment scheduled  Most recent visit in Podiatry: PCP     Diet: No change  Breakfast: no breakfast  Lunch: Sandwiches and fries  Dinner: [employer], other take out type items, tv dinner, Rice, Soup    Exercise: Walking    No Known Allergies    Objective     Current DM Pharmacotherapy:     Ozempic 0.25 mg once weekly (last dose today)  Basaglar 14 units nightly     Clarifications to above regimen: none  Adverse events: not at this time    SECONDARY PREVENTION  - Statin? Yes, atorvastatin 40 mg daily  - ACEi/ARB? No, DC, now on amlodipine and hydralazine  - Aspirin? No    Current monitoring regimen:   Patient is using: glucometer    Testing frequency: FBG every morning and once nightly     SMBG Readings: not currently on his person  FB, 53, 63 (Sick for a week, was not eating), 181, 93, 128  PPB, 152, 182, 188, 216    Any episodes of hypoglycemia? Yes.  Did patient treat episode of hypoglycemia appropriately? Yes, Rule of 15 re-education needed    Pertinent PMH Review:  - PMH of Pancreatitis: Yes  - PMH/FH of Medullary Thyroid Cancer: No  - PMH/FH of Multiple Endocrine Neoplasia (MEN) Type II: No  - PMH of Retinopathy: Follows with ophth for cataract and macular degeneration  - PMH of Urinary Tract Infections/Yeast Infections: No    Lab Review  BMP  Lab Results   Component Value Date    CALCIUM 7.6 (L) 2025     2025    K 5.1 2025    CO2 15 (L) 2025     (H) 2025    BUN 50 (H) 2025    CREATININE 3.94 (H) 2025    EGFR 16 (L) 2025     LFTs  Lab Results   Component Value Date    ALT 41 2024    AST 33 2024    ALKPHOS 121 2024    BILITOT 0.3 2024     FLP  Lab Results    Component Value Date    TRIG 88 01/23/2025    CHOL 126 01/23/2025    LDLF 62 08/17/2023    LDLCALC 76 01/23/2025    HDL 32.2 01/23/2025       The ASCVD Risk score (Willy PHILLIPS, et al., 2019) failed to calculate for the following reasons:    The valid total cholesterol range is 130 to 320 mg/dL  Urine Microalbumin  Lab Results   Component Value Date    MICROALBCREA 1,827.5 (H) 01/09/2020     Albumin/Creatine Ratio   Date Value Ref Range Status   01/09/2020 1,827.5 (H) 0.0 - 30.0 ug/mg crt Final   11/27/2018 1,187.2 (H) 0.0 - 30.0 ug/mg crt Final   12/14/2017 667.7 (H) 0.0 - 30.0 ug/mg crt Final     Weight Management  Wt Readings from Last 3 Encounters:   02/19/25 71.2 kg (157 lb)   01/23/25 77.6 kg (171 lb)   11/07/24 69.7 kg (153 lb 9.6 oz)      There is no height or weight on file to calculate BMI.   A1C  Lab Results   Component Value Date    HGBA1C 7.6 (H) 02/19/2025    HGBA1C 7.1 (H) 08/21/2024    HGBA1C 7.9 (H) 02/28/2024         Assessment/Plan     Problem List Items Addressed This Visit       Type 2 diabetes mellitus     Rationale: Patient having more consistent lows again but has not been feeling well and reports eating less because of this. He is still highly variable throughout the day. Educated patient on importance of not waiting too long to test BG and eat in the morning. Has been testing around 10 am some mornings and waiting to eat breakfast until after. Will decrease basaglar back to 13 units nightly and have patient monitor BG readings for 1 week.    Future considerations: morning basaglar, meal time insulin    Decrease Basaglar to 13 units nightly  Took 4th dose of 0.25 mg today, will increase to 0.5 mg in 1 week  Take daily FBG and PPBG  Follow up in 1 week to discuss BG readings and medication adjustments    Ozempic Education:     - Counseled patient on Ozempic MOA, expectations, side effects, duration of therapy, administration, and monitoring parameters.  - Provided detailed dosing and  administration counseling to ensure proper technique.   - Reviewed Ozempic titration schedule, starting with 0.25 mg once weekly for 4 weeks, then continuing on 0.5 mg once weekly. Pt verbalized understanding.  - Counseled patient on the benefits of GLP-1ra, such as cardiovascular risk reduction, glycemic control, and weight loss potential.  - Reviewed storage requirements of Ozempic when not in use, and when to administer the medication if a dose is missed.  - Advised patient that they may experience improved satiety after meals and portion sizes of meals may be reduced as doses of Ozempic increase.  - Counseled patient to avoid foods that are fatty/oily as this may precipitate the nausea/GI upset that may occur with new start Ozempic.     Monitor for signs and symptoms of pancreatitis:  Pain/tenderness in upper abdomen +/- radiating to back  Fever  Increase heart rate  Upset stomach/vomiting     Kalin Lopez, PharmD  Clinical Pharmacy Specialist, Primary Care     Continue all meds under the continuation of care with the referring provider and clinical pharmacy team

## 2025-05-07 ENCOUNTER — APPOINTMENT (OUTPATIENT)
Dept: PHARMACY | Facility: HOSPITAL | Age: 70
End: 2025-05-07
Payer: MEDICARE

## 2025-05-07 DIAGNOSIS — E11.9 TYPE 2 DIABETES MELLITUS WITHOUT COMPLICATION, UNSPECIFIED WHETHER LONG TERM INSULIN USE: ICD-10-CM

## 2025-05-07 PROCEDURE — RXMED WILLOW AMBULATORY MEDICATION CHARGE

## 2025-05-07 NOTE — PROGRESS NOTES
Clinical Pharmacy Appointment  Subjective     Patient ID: Flip Murphy is a 69 y.o. male who presents for Diabetes.    Referring Provider: Tim Hoffman,      DIABETES MELLITUS TYPE 2:    Does patient follow with Endocrinology: No  Last optometry exam: Appointment scheduled  Most recent visit in Podiatry: PCP     Diet: No change  Breakfast: no breakfast  Lunch: Sandwiches and fries  Dinner: [employer], other take out type items, tv dinner, Rice, Soup    Exercise: Walking    No Known Allergies    Objective     Current DM Pharmacotherapy:     Ozempic 0.5 mg once weekly (First dose today )  Basaglar 13 units nightly     Clarifications to above regimen: none  Adverse events: not at this time    SECONDARY PREVENTION  - Statin? Yes, atorvastatin 40 mg daily  - ACEi/ARB? No, DC, now on amlodipine and hydralazine  - Aspirin? No    Current monitoring regimen:   Patient is using: glucometer    Testing frequency: FBG every morning and once nightly     SMBG Readings: not currently on his person  FB, 75, 98, 61, 124, 124  PPB, 141, 202, 252, 215, 216    Any episodes of hypoglycemia? Yes.  Did patient treat episode of hypoglycemia appropriately? Yes, Rule of 15 re-education needed    Pertinent PMH Review:  - PMH of Pancreatitis: Yes  - PMH/FH of Medullary Thyroid Cancer: No  - PMH/FH of Multiple Endocrine Neoplasia (MEN) Type II: No  - PMH of Retinopathy: Follows with ophth for cataract and macular degeneration  - PMH of Urinary Tract Infections/Yeast Infections: No    Lab Review  BMP  Lab Results   Component Value Date    CALCIUM 7.6 (L) 2025     2025    K 5.1 2025    CO2 15 (L) 2025     (H) 2025    BUN 50 (H) 2025    CREATININE 3.94 (H) 2025    EGFR 16 (L) 2025     LFTs  Lab Results   Component Value Date    ALT 41 2024    AST 33 2024    ALKPHOS 121 2024    BILITOT 0.3 2024     FLP  Lab Results   Component Value Date    TRIG 88  01/23/2025    CHOL 126 01/23/2025    LDLF 62 08/17/2023    LDLCALC 76 01/23/2025    HDL 32.2 01/23/2025       The ASCVD Risk score (Willy PHILLIPS, et al., 2019) failed to calculate for the following reasons:    The valid total cholesterol range is 130 to 320 mg/dL  Urine Microalbumin  Lab Results   Component Value Date    MICROALBCREA 1,827.5 (H) 01/09/2020     Albumin/Creatine Ratio   Date Value Ref Range Status   01/09/2020 1,827.5 (H) 0.0 - 30.0 ug/mg crt Final   11/27/2018 1,187.2 (H) 0.0 - 30.0 ug/mg crt Final   12/14/2017 667.7 (H) 0.0 - 30.0 ug/mg crt Final     Weight Management  Wt Readings from Last 3 Encounters:   02/19/25 71.2 kg (157 lb)   01/23/25 77.6 kg (171 lb)   11/07/24 69.7 kg (153 lb 9.6 oz)      There is no height or weight on file to calculate BMI.   A1C  Lab Results   Component Value Date    HGBA1C 7.6 (H) 02/19/2025    HGBA1C 7.1 (H) 08/21/2024    HGBA1C 7.9 (H) 02/28/2024         Assessment/Plan     Problem List Items Addressed This Visit       Type 2 diabetes mellitus     Rationale: Patient started feeling better and started to eat more. FBG returned to goal range but PPBG now above range. First dose of 0.5 mg ozempic today will likely help cover PPBG increased readings. Will decrease basaglar to 12 units to also account for glp1ra dose increase and adjust as necessary.     Future considerations: morning basaglar, meal time insulin    Decrease Basaglar to 12 units nightly  Continue Ozempic 0.5 mg once weekly (next titration 5/28)  Take daily FBG and PPBG  Follow up in 1 week to discuss BG readings and medication adjustments    Ozempic Education:     - Counseled patient on Ozempic MOA, expectations, side effects, duration of therapy, administration, and monitoring parameters.  - Provided detailed dosing and administration counseling to ensure proper technique.   - Reviewed Ozempic titration schedule, starting with 0.25 mg once weekly for 4 weeks, then continuing on 0.5 mg once weekly. Pt  verbalized understanding.  - Counseled patient on the benefits of GLP-1ra, such as cardiovascular risk reduction, glycemic control, and weight loss potential.  - Reviewed storage requirements of Ozempic when not in use, and when to administer the medication if a dose is missed.  - Advised patient that they may experience improved satiety after meals and portion sizes of meals may be reduced as doses of Ozempic increase.  - Counseled patient to avoid foods that are fatty/oily as this may precipitate the nausea/GI upset that may occur with new start Ozempic.     Monitor for signs and symptoms of pancreatitis:  Pain/tenderness in upper abdomen +/- radiating to back  Fever  Increase heart rate  Upset stomach/vomiting     Kalin Lopez, PharmD  Clinical Pharmacy Specialist, Primary Care     Continue all meds under the continuation of care with the referring provider and clinical pharmacy team

## 2025-05-08 ENCOUNTER — OFFICE VISIT (OUTPATIENT)
Dept: HEMATOLOGY/ONCOLOGY | Facility: CLINIC | Age: 70
End: 2025-05-08
Payer: MEDICARE

## 2025-05-08 ENCOUNTER — LAB (OUTPATIENT)
Dept: LAB | Facility: CLINIC | Age: 70
End: 2025-05-08
Payer: MEDICARE

## 2025-05-08 ENCOUNTER — SPECIALTY PHARMACY (OUTPATIENT)
Dept: PHARMACY | Facility: CLINIC | Age: 70
End: 2025-05-08

## 2025-05-08 ENCOUNTER — PHARMACY VISIT (OUTPATIENT)
Dept: PHARMACY | Facility: CLINIC | Age: 70
End: 2025-05-08
Payer: COMMERCIAL

## 2025-05-08 VITALS
DIASTOLIC BLOOD PRESSURE: 67 MMHG | TEMPERATURE: 97 F | SYSTOLIC BLOOD PRESSURE: 179 MMHG | OXYGEN SATURATION: 98 % | RESPIRATION RATE: 18 BRPM | BODY MASS INDEX: 22.65 KG/M2 | WEIGHT: 153.4 LBS | HEART RATE: 82 BPM

## 2025-05-08 DIAGNOSIS — N18.32 STAGE 3B CHRONIC KIDNEY DISEASE (MULTI): ICD-10-CM

## 2025-05-08 DIAGNOSIS — D50.8 OTHER IRON DEFICIENCY ANEMIA: ICD-10-CM

## 2025-05-08 DIAGNOSIS — D50.8 OTHER IRON DEFICIENCY ANEMIA: Primary | ICD-10-CM

## 2025-05-08 LAB
ALBUMIN SERPL BCP-MCNC: 3.2 G/DL (ref 3.4–5)
ALP SERPL-CCNC: 109 U/L (ref 33–136)
ALT SERPL W P-5'-P-CCNC: 43 U/L (ref 10–52)
ANION GAP SERPL CALC-SCNC: 15 MMOL/L (ref 10–20)
AST SERPL W P-5'-P-CCNC: 26 U/L (ref 9–39)
BASOPHILS # BLD AUTO: 0.05 X10*3/UL (ref 0–0.1)
BASOPHILS NFR BLD AUTO: 0.5 %
BILIRUB SERPL-MCNC: 0.3 MG/DL (ref 0–1.2)
BUN SERPL-MCNC: 44 MG/DL (ref 6–23)
CALCIUM SERPL-MCNC: 7.3 MG/DL (ref 8.6–10.6)
CHLORIDE SERPL-SCNC: 108 MMOL/L (ref 98–107)
CO2 SERPL-SCNC: 20 MMOL/L (ref 21–32)
CREAT SERPL-MCNC: 4.29 MG/DL (ref 0.5–1.3)
EGFRCR SERPLBLD CKD-EPI 2021: 14 ML/MIN/1.73M*2
EOSINOPHIL # BLD AUTO: 0.26 X10*3/UL (ref 0–0.7)
EOSINOPHIL NFR BLD AUTO: 2.6 %
ERYTHROCYTE [DISTWIDTH] IN BLOOD BY AUTOMATED COUNT: 13.8 % (ref 11.5–14.5)
FERRITIN SERPL-MCNC: 204 NG/ML (ref 20–300)
FOLATE SERPL-MCNC: 16 NG/ML
GLUCOSE SERPL-MCNC: 124 MG/DL (ref 74–99)
HCT VFR BLD AUTO: 31.7 % (ref 41–52)
HGB BLD-MCNC: 10.2 G/DL (ref 13.5–17.5)
IMM GRANULOCYTES # BLD AUTO: 0.01 X10*3/UL (ref 0–0.7)
IMM GRANULOCYTES NFR BLD AUTO: 0.1 % (ref 0–0.9)
IRON SATN MFR SERPL: 36 % (ref 25–45)
IRON SERPL-MCNC: 78 UG/DL (ref 35–150)
LYMPHOCYTES # BLD AUTO: 2.35 X10*3/UL (ref 1.2–4.8)
LYMPHOCYTES NFR BLD AUTO: 23.7 %
MCH RBC QN AUTO: 27.6 PG (ref 26–34)
MCHC RBC AUTO-ENTMCNC: 32.2 G/DL (ref 32–36)
MCV RBC AUTO: 86 FL (ref 80–100)
MONOCYTES # BLD AUTO: 0.78 X10*3/UL (ref 0.1–1)
MONOCYTES NFR BLD AUTO: 7.9 %
NEUTROPHILS # BLD AUTO: 6.48 X10*3/UL (ref 1.2–7.7)
NEUTROPHILS NFR BLD AUTO: 65.2 %
NRBC BLD-RTO: ABNORMAL /100{WBCS}
PLATELET # BLD AUTO: 229 X10*3/UL (ref 150–450)
POTASSIUM SERPL-SCNC: 4.5 MMOL/L (ref 3.5–5.3)
PROT SERPL-MCNC: 6.1 G/DL (ref 6.4–8.2)
RBC # BLD AUTO: 3.69 X10*6/UL (ref 4.5–5.9)
SODIUM SERPL-SCNC: 138 MMOL/L (ref 136–145)
TIBC SERPL-MCNC: 215 UG/DL (ref 240–445)
UIBC SERPL-MCNC: 137 UG/DL (ref 110–370)
VIT B12 SERPL-MCNC: 755 PG/ML (ref 211–911)
WBC # BLD AUTO: 9.9 X10*3/UL (ref 4.4–11.3)

## 2025-05-08 PROCEDURE — 36415 COLL VENOUS BLD VENIPUNCTURE: CPT

## 2025-05-08 PROCEDURE — 82746 ASSAY OF FOLIC ACID SERUM: CPT

## 2025-05-08 PROCEDURE — 99213 OFFICE O/P EST LOW 20 MIN: CPT | Performed by: NURSE PRACTITIONER

## 2025-05-08 PROCEDURE — 1160F RVW MEDS BY RX/DR IN RCRD: CPT | Performed by: NURSE PRACTITIONER

## 2025-05-08 PROCEDURE — 1159F MED LIST DOCD IN RCRD: CPT | Performed by: NURSE PRACTITIONER

## 2025-05-08 PROCEDURE — 80053 COMPREHEN METABOLIC PANEL: CPT

## 2025-05-08 PROCEDURE — 83540 ASSAY OF IRON: CPT

## 2025-05-08 PROCEDURE — 3077F SYST BP >= 140 MM HG: CPT | Performed by: NURSE PRACTITIONER

## 2025-05-08 PROCEDURE — 3048F LDL-C <100 MG/DL: CPT | Performed by: NURSE PRACTITIONER

## 2025-05-08 PROCEDURE — 3078F DIAST BP <80 MM HG: CPT | Performed by: NURSE PRACTITIONER

## 2025-05-08 PROCEDURE — 1036F TOBACCO NON-USER: CPT | Performed by: NURSE PRACTITIONER

## 2025-05-08 PROCEDURE — 1126F AMNT PAIN NOTED NONE PRSNT: CPT | Performed by: NURSE PRACTITIONER

## 2025-05-08 PROCEDURE — 82728 ASSAY OF FERRITIN: CPT

## 2025-05-08 PROCEDURE — 82607 VITAMIN B-12: CPT

## 2025-05-08 PROCEDURE — 85025 COMPLETE CBC W/AUTO DIFF WBC: CPT

## 2025-05-08 RX ORDER — MULTIVIT-MIN/IRON FUM/FOLIC AC 7.5 MG-4
1 TABLET ORAL DAILY
COMMUNITY

## 2025-05-08 ASSESSMENT — PAIN SCALES - GENERAL: PAINLEVEL_OUTOF10: 0-NO PAIN

## 2025-05-09 NOTE — PROGRESS NOTES
Patient ID: Flip Murphy is a 69 y.o. male.  Referring Physician: Rosanne M Casal, APRN-CNP, DNP  [id number] [place] [address]  Primary Care Provider: Tim Hoffman DO  Visit Type: Follow Up      Subjective    Location:  [facility]  Initial consult: 5/2/2024  Reason: Anemia     Patient is a 70yo  man with a PMH of DM, CKD, elevated LFT's former 50 pack year smoker, AAA, HTN, hyperlipidemia, carotid stenosis, PVD, pulmonary HTN,  referred to benign hematology for anemia consult by Dr. Hoffman.     Follow up visit today.   Denies abnormal weight loss, night sweats, fever. Slight increased fatigue. 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 Denies chills, sob, cp, n/v/d, n/t. No PICA. Denies any abnormal bleeding or bruising. No recurrent infections or lymphadenopathy. No joint/body pain. No known blood disorders in family. Has had surgery in past w/o issue. Never had blood/blood products. Denies NSAID use.        Objective   BSA: 1.84 meters squared  /67 (BP Location: Left arm, Patient Position: Sitting, BP Cuff Size: Adult)   Pulse 82   Temp 36.1 °C (97 °F) (Core)   Resp 18   Wt 69.6 kg (153 lb 6.4 oz)   SpO2 98%   BMI 22.65 kg/m²      has a past medical history of Abdominal distension (gaseous) (07/16/2019), Change in bowel habit (07/16/2019), Elevated plasma metanephrines (05/19/2016), Encounter for other preprocedural examination, Generalized abdominal pain (07/05/2019), Hypertensive urgency (11/26/2022), Irritable bowel syndrome with constipation (07/02/2019), Leukocytosis (08/16/2023), Other fecal abnormalities (06/27/2019), Other specified diseases of intestine (09/05/2019), Other specified postprocedural states, Personal history of other diseases of the digestive system (05/31/2019), 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   has a past surgical history that includes Other surgical history (07/05/2019) and CT angio neck (3/24/2017).  Family History[1]    Flip Murphy  reports that he quit smoking about 2 years ago. His smoking use included cigarettes. He started smoking about 48 years ago. He has a 46 pack-year smoking history. He has never used smokeless tobacco.  He  reports that he does not currently use alcohol.  He  reports that he does not currently use drugs.    Physical Exam  HENT:      Head: Normocephalic.      Nose: Nose normal.      Mouth/Throat:      Mouth: Mucous membranes are moist.   Eyes:      Pupils: Pupils are equal, round, and reactive to light.   Cardiovascular:      Rate and Rhythm: Normal rate and regular rhythm.      Pulses: Normal pulses.      Heart sounds: Normal heart sounds.   Pulmonary:      Effort: Pulmonary effort is normal.      Breath sounds: Normal breath sounds.   Abdominal:      General: Bowel sounds are normal.      Palpations: Abdomen is soft.   Musculoskeletal:         General: Normal range of motion.   Skin:     General: Skin is warm and dry.   Neurological:      General: No focal deficit present.      Mental Status: He is alert and oriented to person, place, and time.   Psychiatric:         Mood and Affect: Mood normal.         Behavior: Behavior normal.         WBC   Date/Time Value Ref Range Status   05/08/2025 03:41 PM 9.9 4.4 - 11.3 x10*3/uL Final   01/23/2025 11:43 AM 12.2 (H) 4.4 - 11.3 x10*3/uL Final   11/07/2024 11:04 AM 13.0 (H) 4.4 - 11.3 x10*3/uL Final     nRBC   Date Value Ref Range Status   05/08/2025   Final     Comment:     Not Measured   01/23/2025 0.0 0.0 - 0.0 /100 WBCs Final   11/07/2024   Final     Comment:     Not Measured     RBC   Date Value Ref Range Status   05/08/2025 3.69 (L) 4.50 - 5.90 x10*6/uL Final   01/23/2025 4.27 (L) 4.50 - 5.90 x10*6/uL Final   11/07/2024 4.13 (L) 4.50 - 5.90 x10*6/uL Final     Hemoglobin   Date Value  "Ref Range Status   05/08/2025 10.2 (L) 13.5 - 17.5 g/dL Final   01/23/2025 11.6 (L) 13.5 - 17.5 g/dL Final   11/07/2024 11.2 (L) 13.5 - 17.5 g/dL Final     Hematocrit   Date Value Ref Range Status   05/08/2025 31.7 (L) 41.0 - 52.0 % Final   01/23/2025 39.4 (L) 41.0 - 52.0 % Final   11/07/2024 36.1 (L) 41.0 - 52.0 % Final     MCV   Date/Time Value Ref Range Status   05/08/2025 03:41 PM 86 80 - 100 fL Final   01/23/2025 11:43 AM 92 80 - 100 fL Final   11/07/2024 11:04 AM 87 80 - 100 fL Final     MCH   Date/Time Value Ref Range Status   05/08/2025 03:41 PM 27.6 26.0 - 34.0 pg Final   01/23/2025 11:43 AM 27.2 26.0 - 34.0 pg Final   11/07/2024 11:04 AM 27.1 26.0 - 34.0 pg Final     MCHC   Date/Time Value Ref Range Status   05/08/2025 03:41 PM 32.2 32.0 - 36.0 g/dL Final   01/23/2025 11:43 AM 29.4 (L) 32.0 - 36.0 g/dL Final   11/07/2024 11:04 AM 31.0 (L) 32.0 - 36.0 g/dL Final     RDW   Date/Time Value Ref Range Status   05/08/2025 03:41 PM 13.8 11.5 - 14.5 % Final   01/23/2025 11:43 AM 14.0 11.5 - 14.5 % Final   11/07/2024 11:04 AM 14.5 11.5 - 14.5 % Final     Platelets   Date/Time Value Ref Range Status   05/08/2025 03:41  150 - 450 x10*3/uL Final   01/23/2025 11:43  150 - 450 x10*3/uL Final   11/07/2024 11:04  150 - 450 x10*3/uL Final     No results found for: \"MPV\"  Neutrophils %   Date/Time Value Ref Range Status   05/08/2025 03:41 PM 65.2 40.0 - 80.0 % Final   11/07/2024 11:04 AM 68.7 40.0 - 80.0 % Final   08/21/2024 11:47 AM 66.7 40.0 - 80.0 % Final     Immature Granulocytes %, Automated   Date/Time Value Ref Range Status   05/08/2025 03:41 PM 0.1 0.0 - 0.9 % Final     Comment:     Immature Granulocyte Count (IG) includes promyelocytes, myelocytes and metamyelocytes but does not include bands. Percent differential counts (%) should be interpreted in the context of the absolute cell counts (cells/UL).   11/07/2024 11:04 AM 0.2 0.0 - 0.9 % Final     Comment:     Immature Granulocyte Count (IG) " includes promyelocytes, myelocytes and metamyelocytes but does not include bands. Percent differential counts (%) should be interpreted in the context of the absolute cell counts (cells/UL).   08/21/2024 11:47 AM 0.5 0.0 - 0.9 % Final     Comment:     Immature Granulocyte Count (IG) includes promyelocytes, myelocytes and metamyelocytes but does not include bands. Percent differential counts (%) should be interpreted in the context of the absolute cell counts (cells/UL).     Lymphocytes %   Date/Time Value Ref Range Status   05/08/2025 03:41 PM 23.7 13.0 - 44.0 % Final   11/07/2024 11:04 AM 20.2 13.0 - 44.0 % Final   08/21/2024 11:47 AM 18.8 13.0 - 44.0 % Final     Monocytes %   Date/Time Value Ref Range Status   05/08/2025 03:41 PM 7.9 2.0 - 10.0 % Final   11/07/2024 11:04 AM 7.1 2.0 - 10.0 % Final   08/21/2024 11:47 AM 10.0 2.0 - 10.0 % Final     Eosinophils %   Date/Time Value Ref Range Status   05/08/2025 03:41 PM 2.6 0.0 - 6.0 % Final   11/07/2024 11:04 AM 3.5 0.0 - 6.0 % Final   08/21/2024 11:47 AM 3.1 0.0 - 6.0 % Final     Basophils %   Date/Time Value Ref Range Status   05/08/2025 03:41 PM 0.5 0.0 - 2.0 % Final   11/07/2024 11:04 AM 0.3 0.0 - 2.0 % Final   08/21/2024 11:47 AM 0.9 0.0 - 2.0 % Final     Neutrophils Absolute   Date/Time Value Ref Range Status   05/08/2025 03:41 PM 6.48 1.20 - 7.70 x10*3/uL Final     Comment:     Percent differential counts (%) should be interpreted in the context of the absolute cell counts (cells/uL).   11/07/2024 11:04 AM 8.96 (H) 1.20 - 7.70 x10*3/uL Final     Comment:     Percent differential counts (%) should be interpreted in the context of the absolute cell counts (cells/uL).   08/21/2024 11:47 AM 8.49 (H) 1.20 - 7.70 x10*3/uL Final     Comment:     Percent differential counts (%) should be interpreted in the context of the absolute cell counts (cells/uL).     Immature Granulocytes Absolute, Automated   Date/Time Value Ref Range Status   05/08/2025 03:41 PM 0.01 0.00 -  0.70 x10*3/uL Final   11/07/2024 11:04 AM 0.03 0.00 - 0.70 x10*3/uL Final   08/21/2024 11:47 AM 0.07 0.00 - 0.70 x10*3/uL Final     Lymphocytes Absolute   Date/Time Value Ref Range Status   05/08/2025 03:41 PM 2.35 1.20 - 4.80 x10*3/uL Final   11/07/2024 11:04 AM 2.63 1.20 - 4.80 x10*3/uL Final   08/21/2024 11:47 AM 2.40 1.20 - 4.80 x10*3/uL Final     Monocytes Absolute   Date/Time Value Ref Range Status   05/08/2025 03:41 PM 0.78 0.10 - 1.00 x10*3/uL Final   11/07/2024 11:04 AM 0.92 0.10 - 1.00 x10*3/uL Final   08/21/2024 11:47 AM 1.27 (H) 0.10 - 1.00 x10*3/uL Final     Eosinophils Absolute   Date/Time Value Ref Range Status   05/08/2025 03:41 PM 0.26 0.00 - 0.70 x10*3/uL Final   11/07/2024 11:04 AM 0.46 0.00 - 0.70 x10*3/uL Final   08/21/2024 11:47 AM 0.40 0.00 - 0.70 x10*3/uL Final     Basophils Absolute   Date/Time Value Ref Range Status   05/08/2025 03:41 PM 0.05 0.00 - 0.10 x10*3/uL Final   11/07/2024 11:04 AM 0.04 0.00 - 0.10 x10*3/uL Final   08/21/2024 11:47 AM 0.11 (H) 0.00 - 0.10 x10*3/uL Final     Assessment/Plan    68 yo  man with a PMH of DM, CKD, elevated LFT's former 50 pack year smoker, AAA, HTN, hyperlipidemia, carotid stenosis, PVD, pulmonary HTN,  referred to benign hematology for anemia consult by Dr. Hoffman.     Review of labs noted a mild normocytic anemia. Last labs were drawn in August. Hgb 11.3, MCV 87, remainder of CBC/diff WNL at that time. Last GFR 30, creatinine 3.18. He has an appointment to see Nephrology. Patient has elevated light chains most likely due to CKD. No MGUS     Discussed possible etiologies including multifactorial, nutritional deficiencies, anemia of chronic disease, malabsorption, hemopathy, medications, bleeding, malignancy, etc. Our workup showed a mild normocytic anemia most likely secondary to CKD. SFLC ratio elevated most likely due to CKD. No monoclonal protein noted. Adequate iron and B12, however low folic acid was 4.8 in May. Patient has been  taking folic acid 1 mg po daily. Repeated folic acid level along with B12, and iron studies. At some point may need EPO support but with hgb of 11.2 this is not needed at this time.   Plan:  Will call patient tomorrow with pending labs. Hemoglobin has decreased so he may need iron or B12 supplementation.  Continue folic acid 1mg po daily. Prescrition sent to his local pharmacy.   CBC/diff, iron studies, B12, Folate and CMP in 3 months and again in 6 months with exam.   Follow up with PCP/Nephrology for CKD and other issues.     I had an extensive discussion with the patient regarding the diagnosis and discussed the plan of therapy, including general considerations regarding side effects and outcomes. Pt understood and gave appropriate teach back about the plan of care. All questions were answered to the patient's satisfaction. The patient is instructed to contact us at any time if questions or problems arise. Thank you for the opportunity to participate in the care of this very pleasant patient.     Problem List Items Addressed This Visit           ICD-10-CM    CKD (chronic kidney disease) N18.9    Relevant Orders    CBC and Auto Differential (Completed)    Comprehensive Metabolic Panel    Ferritin    Folate    Iron and TIBC    Vitamin B12     Other Visit Diagnoses         Codes      Other iron deficiency anemia    -  Primary D50.8    Relevant Orders    CBC and Auto Differential (Completed)    Comprehensive Metabolic Panel    Ferritin    Folate    Iron and TIBC    Vitamin B12                 Rosanne M Casal, APRN-CNP, DNP                              [1] No family history on file.

## 2025-05-14 ENCOUNTER — APPOINTMENT (OUTPATIENT)
Dept: PHARMACY | Facility: HOSPITAL | Age: 70
End: 2025-05-14
Payer: MEDICARE

## 2025-05-14 DIAGNOSIS — E11.9 TYPE 2 DIABETES MELLITUS WITHOUT COMPLICATION, UNSPECIFIED WHETHER LONG TERM INSULIN USE: ICD-10-CM

## 2025-05-14 NOTE — PROGRESS NOTES
Clinical Pharmacy Appointment  Subjective     Patient ID: Flip Murphy is a 69 y.o. male who presents for Diabetes.    Referring Provider: Tim Hoffman,      DIABETES MELLITUS TYPE 2:    Does patient follow with Endocrinology: No  Last optometry exam: Appointment scheduled  Most recent visit in Podiatry: PCP     Diet: No change  Breakfast: no breakfast  Lunch: Sandwiches and fries  Dinner: Chipot[employer], other take out type items, tv dinner, Rice, Soup    Exercise: Walking    No Known Allergies    Objective     Current DM Pharmacotherapy:     Ozempic 0.5 mg once weekly (First dose today )  Basaglar 13 units nightly     Clarifications to above regimen: none  Adverse events: not at this time    SECONDARY PREVENTION  - Statin? Yes, atorvastatin 40 mg daily  - ACEi/ARB? No, DC, now on amlodipine and hydralazine  - Aspirin? No    Current monitoring regimen:   Patient is using: glucometer    Testing frequency: FBG every morning and once nightly     SMBG Readings: not currently on his person  FB, 122, 61, 78, 95, 74,  PPB, 137, 156, 114, 138, 211    Any episodes of hypoglycemia? Yes.  Did patient treat episode of hypoglycemia appropriately? Yes, Followed rule of 15    Pertinent PMH Review:  - PMH of Pancreatitis: Yes  - PMH/FH of Medullary Thyroid Cancer: No  - PMH/FH of Multiple Endocrine Neoplasia (MEN) Type II: No  - PMH of Retinopathy: Follows with ophth for cataract and macular degeneration  - PMH of Urinary Tract Infections/Yeast Infections: No    Lab Review  BMP  Lab Results   Component Value Date    CALCIUM 7.3 (L) 2025     2025    K 4.5 2025    CO2 20 (L) 2025     (H) 2025    BUN 44 (H) 2025    CREATININE 4.29 (H) 2025    EGFR 14 (L) 2025     LFTs  Lab Results   Component Value Date    ALT 43 2025    AST 26 2025    ALKPHOS 109 2025    BILITOT 0.3 2025     FLP  Lab Results   Component Value Date    TRIG 88  01/23/2025    CHOL 126 01/23/2025    LDLF 62 08/17/2023    LDLCALC 76 01/23/2025    HDL 32.2 01/23/2025       The ASCVD Risk score (Willy PHILLIPS, et al., 2019) failed to calculate for the following reasons:    The valid total cholesterol range is 130 to 320 mg/dL  Urine Microalbumin  Lab Results   Component Value Date    MICROALBCREA 1,827.5 (H) 01/09/2020     Albumin/Creatine Ratio   Date Value Ref Range Status   01/09/2020 1,827.5 (H) 0.0 - 30.0 ug/mg crt Final   11/27/2018 1,187.2 (H) 0.0 - 30.0 ug/mg crt Final   12/14/2017 667.7 (H) 0.0 - 30.0 ug/mg crt Final     Weight Management  Wt Readings from Last 3 Encounters:   05/08/25 69.6 kg (153 lb 6.4 oz)   02/19/25 71.2 kg (157 lb)   01/23/25 77.6 kg (171 lb)      There is no height or weight on file to calculate BMI.   A1C  Lab Results   Component Value Date    HGBA1C 7.6 (H) 02/19/2025    HGBA1C 7.1 (H) 08/21/2024    HGBA1C 7.9 (H) 02/28/2024         Assessment/Plan     Problem List Items Addressed This Visit       Type 2 diabetes mellitus     Rationale: Patient BG readings are largely at goal but running on low side for FBG. Will decrease basaglar to 10 units nightly and monitor readings for another week. Next dose titration of ozempic to be evaluated in June.    Future considerations: dose titration, decrease insulin    Decrease Basaglar to 10 units nightly  Continue Ozempic 0.5 mg once weekly (next titration 6/4)  Take daily FBG and PPBG  Follow up in 1 week to discuss BG readings and medication adjustments    Ozempic Education:     - Counseled patient on Ozempic MOA, expectations, side effects, duration of therapy, administration, and monitoring parameters.  - Provided detailed dosing and administration counseling to ensure proper technique.   - Reviewed Ozempic titration schedule, starting with 0.25 mg once weekly for 4 weeks, then continuing on 0.5 mg once weekly. Pt verbalized understanding.  - Counseled patient on the benefits of GLP-1ra, such as  cardiovascular risk reduction, glycemic control, and weight loss potential.  - Reviewed storage requirements of Ozempic when not in use, and when to administer the medication if a dose is missed.  - Advised patient that they may experience improved satiety after meals and portion sizes of meals may be reduced as doses of Ozempic increase.  - Counseled patient to avoid foods that are fatty/oily as this may precipitate the nausea/GI upset that may occur with new start Ozempic.     Monitor for signs and symptoms of pancreatitis:  Pain/tenderness in upper abdomen +/- radiating to back  Fever  Increase heart rate  Upset stomach/vomiting     Kalin Lopez, PharmD  Clinical Pharmacy Specialist, Primary Care     Continue all meds under the continuation of care with the referring provider and clinical pharmacy team

## 2025-05-21 ENCOUNTER — APPOINTMENT (OUTPATIENT)
Dept: PHARMACY | Facility: HOSPITAL | Age: 70
End: 2025-05-21
Payer: MEDICARE

## 2025-05-21 ENCOUNTER — SPECIALTY PHARMACY (OUTPATIENT)
Dept: PHARMACY | Facility: CLINIC | Age: 70
End: 2025-05-21

## 2025-05-21 DIAGNOSIS — E11.9 TYPE 2 DIABETES MELLITUS WITHOUT COMPLICATION, UNSPECIFIED WHETHER LONG TERM INSULIN USE: ICD-10-CM

## 2025-05-21 NOTE — PROGRESS NOTES
Clinical Pharmacy Appointment  Subjective     Patient ID: Flip Murphy is a 69 y.o. male who presents for Diabetes.    Referring Provider: Tim Hoffman,      DIABETES MELLITUS TYPE 2:    Does patient follow with Endocrinology: No  Last optometry exam: Appointment scheduled  Most recent visit in Podiatry: PCP     Diet: No change  Breakfast: pancakes  Lunch: Sandwiches and fries  Dinner: Chipot[employer], other take out type items, tv dinner, Rice, Soup    Exercise: Walking    No Known Allergies    Objective     Current DM Pharmacotherapy:     Ozempic 0.5 mg once weekly (First dose today )  Basaglar 10 units nightly     Clarifications to above regimen: none  Adverse events: not at this time    SECONDARY PREVENTION  - Statin? Yes, atorvastatin 40 mg daily  - ACEi/ARB? No, DC, now on amlodipine and hydralazine  - Aspirin? No    Current monitoring regimen:   Patient is using: glucometer    Testing frequency: FBG every morning and once nightly     SMBG Readings: not currently on his person  FB, 77, 92, 96, 110, 88  PPB, 141, 131, 127, 155, 116    Any episodes of hypoglycemia? No.  Did patient treat episode of hypoglycemia appropriately? N/A    Pertinent PMH Review:  - PMH of Pancreatitis: Yes  - PMH/FH of Medullary Thyroid Cancer: No  - PMH/FH of Multiple Endocrine Neoplasia (MEN) Type II: No  - PMH of Retinopathy: Follows with ophth for cataract and macular degeneration  - PMH of Urinary Tract Infections/Yeast Infections: No    Lab Review  BMP  Lab Results   Component Value Date    CALCIUM 7.3 (L) 2025     2025    K 4.5 2025    CO2 20 (L) 2025     (H) 2025    BUN 44 (H) 2025    CREATININE 4.29 (H) 2025    EGFR 14 (L) 2025     LFTs  Lab Results   Component Value Date    ALT 43 2025    AST 26 2025    ALKPHOS 109 2025    BILITOT 0.3 2025     FLP  Lab Results   Component Value Date    TRIG 88 2025    CHOL 126 2025     LDLF 62 08/17/2023    LDLCALC 76 01/23/2025    HDL 32.2 01/23/2025       The ASCVD Risk score (Willy DK, et al., 2019) failed to calculate for the following reasons:    The valid total cholesterol range is 130 to 320 mg/dL  Urine Microalbumin  Lab Results   Component Value Date    MICROALBCREA 1,827.5 (H) 01/09/2020     Albumin/Creatine Ratio   Date Value Ref Range Status   01/09/2020 1,827.5 (H) 0.0 - 30.0 ug/mg crt Final   11/27/2018 1,187.2 (H) 0.0 - 30.0 ug/mg crt Final   12/14/2017 667.7 (H) 0.0 - 30.0 ug/mg crt Final     Weight Management  Wt Readings from Last 3 Encounters:   05/08/25 69.6 kg (153 lb 6.4 oz)   02/19/25 71.2 kg (157 lb)   01/23/25 77.6 kg (171 lb)      There is no height or weight on file to calculate BMI.   A1C  Lab Results   Component Value Date    HGBA1C 7.6 (H) 02/19/2025    HGBA1C 7.1 (H) 08/21/2024    HGBA1C 7.9 (H) 02/28/2024         Assessment/Plan     Problem List Items Addressed This Visit       Type 2 diabetes mellitus     Rationale: Continuous decreases in insulin have not had major impact on FBG/PPBG readings. Will trial 1 week of no insulin. Counseled patient on if FBG >130 for consecutive days or PPBG >180 for consecutive days to contact clinical pharmacist or start basaglar 6 units once nightly. Next ozempic titration eligible at 5/28 visit.     Future considerations: dose titration    STOP Basaglar  If FBG >130 or PPBG >180 for consecutive days contact clinical pharmacist or restart basaglar at 6 units once daily  Continue Ozempic 0.5 mg once weekly (next titration 5/28)  Take daily FBG and PPBG  Follow up in 1 week to discuss BG readings and medication adjustments    Ozempic Education:     - Counseled patient on Ozempic MOA, expectations, side effects, duration of therapy, administration, and monitoring parameters.  - Provided detailed dosing and administration counseling to ensure proper technique.   - Reviewed Ozempic titration schedule, starting with 0.25 mg once weekly  for 4 weeks, then continuing on 0.5 mg once weekly. Pt verbalized understanding.  - Counseled patient on the benefits of GLP-1ra, such as cardiovascular risk reduction, glycemic control, and weight loss potential.  - Reviewed storage requirements of Ozempic when not in use, and when to administer the medication if a dose is missed.  - Advised patient that they may experience improved satiety after meals and portion sizes of meals may be reduced as doses of Ozempic increase.  - Counseled patient to avoid foods that are fatty/oily as this may precipitate the nausea/GI upset that may occur with new start Ozempic.     Monitor for signs and symptoms of pancreatitis:  Pain/tenderness in upper abdomen +/- radiating to back  Fever  Increase heart rate  Upset stomach/vomiting     Kalin Lopez, PharmD  Clinical Pharmacy Specialist, Primary Care     Continue all meds under the continuation of care with the referring provider and clinical pharmacy team

## 2025-05-22 ENCOUNTER — SPECIALTY PHARMACY (OUTPATIENT)
Dept: PHARMACY | Facility: CLINIC | Age: 70
End: 2025-05-22

## 2025-05-22 PROCEDURE — RXMED WILLOW AMBULATORY MEDICATION CHARGE

## 2025-05-23 ENCOUNTER — PHARMACY VISIT (OUTPATIENT)
Dept: PHARMACY | Facility: CLINIC | Age: 70
End: 2025-05-23
Payer: COMMERCIAL

## 2025-05-28 ENCOUNTER — APPOINTMENT (OUTPATIENT)
Dept: PHARMACY | Facility: HOSPITAL | Age: 70
End: 2025-05-28
Payer: MEDICARE

## 2025-05-28 DIAGNOSIS — E11.9 TYPE 2 DIABETES MELLITUS WITHOUT COMPLICATION, UNSPECIFIED WHETHER LONG TERM INSULIN USE: ICD-10-CM

## 2025-05-28 PROCEDURE — RXMED WILLOW AMBULATORY MEDICATION CHARGE

## 2025-05-28 NOTE — PROGRESS NOTES
Clinical Pharmacy Appointment  Subjective     Patient ID: Flip Murphy is a 69 y.o. male who presents for Diabetes.    Referring Provider: Tim Hoffman, DO     DIABETES MELLITUS TYPE 2:    Does patient follow with Endocrinology: No  Last optometry exam: Appointment scheduled  Most recent visit in Podiatry: PCP     Diet: No change  Breakfast: pancakes  Lunch: Sandwiches and fries  Dinner: Chipotle, other take out type items, tv dinner, Rice, Soup    Exercise: Walking    No Known Allergies    Objective     Current DM Pharmacotherapy:     Ozempic 0.5 mg once weekly (First dose today )  Stopped basaglar - needed to use 6 units twice last week because of numbers rising above goal    Clarifications to above regimen: noted above  Adverse events: not at this time    SECONDARY PREVENTION  - Statin? Yes, atorvastatin 40 mg daily  - ACEi/ARB? No, DC, now on amlodipine and hydralazine  - Aspirin? No    Current monitoring regimen:   Patient is using: glucometer    Testing frequency: FBG every morning and once nightly     SMBG Readings: not currently on his person  FB, 103, 199 (ate before), 145, 91, 116, 135  PPB took 6 units, 120, 150, 232 took 6 units of insulin, 196    Any episodes of hypoglycemia? No.  Did patient treat episode of hypoglycemia appropriately? N/A    Pertinent PMH Review:  - PMH of Pancreatitis: Yes  - PMH/FH of Medullary Thyroid Cancer: No  - PMH/FH of Multiple Endocrine Neoplasia (MEN) Type II: No  - PMH of Retinopathy: Follows with ophth for cataract and macular degeneration  - PMH of Urinary Tract Infections/Yeast Infections: No    Lab Review  BMP  Lab Results   Component Value Date    CALCIUM 7.3 (L) 2025     2025    K 4.5 2025    CO2 20 (L) 2025     (H) 2025    BUN 44 (H) 2025    CREATININE 4.29 (H) 2025    EGFR 14 (L) 2025     LFTs  Lab Results   Component Value Date    ALT 43 2025    AST 26 2025    ALKPHOS 109  05/08/2025    BILITOT 0.3 05/08/2025     FLP  Lab Results   Component Value Date    TRIG 88 01/23/2025    CHOL 126 01/23/2025    LDLF 62 08/17/2023    LDLCALC 76 01/23/2025    HDL 32.2 01/23/2025       The ASCVD Risk score (Willy PHILLIPS, et al., 2019) failed to calculate for the following reasons:    The valid total cholesterol range is 130 to 320 mg/dL  Urine Microalbumin  Lab Results   Component Value Date    MICROALBCREA 1,827.5 (H) 01/09/2020     Albumin/Creatine Ratio   Date Value Ref Range Status   01/09/2020 1,827.5 (H) 0.0 - 30.0 ug/mg crt Final   11/27/2018 1,187.2 (H) 0.0 - 30.0 ug/mg crt Final   12/14/2017 667.7 (H) 0.0 - 30.0 ug/mg crt Final     Weight Management  Wt Readings from Last 3 Encounters:   05/08/25 69.6 kg (153 lb 6.4 oz)   02/19/25 71.2 kg (157 lb)   01/23/25 77.6 kg (171 lb)      There is no height or weight on file to calculate BMI.   A1C  Lab Results   Component Value Date    HGBA1C 7.6 (H) 02/19/2025    HGBA1C 7.1 (H) 08/21/2024    HGBA1C 7.9 (H) 02/28/2024         Assessment/Plan     Problem List Items Addressed This Visit       Type 2 diabetes mellitus     Rationale: Patient is slightly above goal depending on diet with no regular insulin last week. Needed insulin twice in a week span to correct back to goal. He is due for a ozempic titration and will increase dose to 1 mg before evaluating for necessary daily insulin requirement.    Future considerations: dose titration    STOP Basaglar  If FBG >130 or PPBG >180 for consecutive days contact clinical pharmacist or restart basaglar at 6 units once daily  Increase Ozempic 1 mg once weekly (next titration July 2)  Take daily FBG and PPBG  Follow up in 1 week to discuss BG readings and medication adjustments    Ozempic Education:     - Counseled patient on Ozempic MOA, expectations, side effects, duration of therapy, administration, and monitoring parameters.  - Provided detailed dosing and administration counseling to ensure proper  technique.   - Reviewed Ozempic titration schedule, starting with 0.25 mg once weekly for 4 weeks, then continuing on 0.5 mg once weekly. Pt verbalized understanding.  - Counseled patient on the benefits of GLP-1ra, such as cardiovascular risk reduction, glycemic control, and weight loss potential.  - Reviewed storage requirements of Ozempic when not in use, and when to administer the medication if a dose is missed.  - Advised patient that they may experience improved satiety after meals and portion sizes of meals may be reduced as doses of Ozempic increase.  - Counseled patient to avoid foods that are fatty/oily as this may precipitate the nausea/GI upset that may occur with new start Ozempic.     Monitor for signs and symptoms of pancreatitis:  Pain/tenderness in upper abdomen +/- radiating to back  Fever  Increase heart rate  Upset stomach/vomiting     Kalin Lopez, PharmD  Clinical Pharmacy Specialist, Primary Care     Continue all meds under the continuation of care with the referring provider and clinical pharmacy team

## 2025-05-29 ENCOUNTER — APPOINTMENT (OUTPATIENT)
Dept: NEPHROLOGY | Facility: CLINIC | Age: 70
End: 2025-05-29
Payer: MEDICARE

## 2025-05-29 VITALS
SYSTOLIC BLOOD PRESSURE: 111 MMHG | BODY MASS INDEX: 21.62 KG/M2 | HEIGHT: 69 IN | HEART RATE: 81 BPM | WEIGHT: 146 LBS | DIASTOLIC BLOOD PRESSURE: 65 MMHG

## 2025-05-29 DIAGNOSIS — N18.4 ANEMIA DUE TO STAGE 4 CHRONIC KIDNEY DISEASE: ICD-10-CM

## 2025-05-29 DIAGNOSIS — E87.22 CHRONIC METABOLIC ACIDOSIS: ICD-10-CM

## 2025-05-29 DIAGNOSIS — E11.22 TYPE 2 DIABETES MELLITUS WITH DIABETIC CHRONIC KIDNEY DISEASE, UNSPECIFIED CKD STAGE, UNSPECIFIED WHETHER LONG TERM INSULIN USE: Primary | ICD-10-CM

## 2025-05-29 DIAGNOSIS — D63.1 ANEMIA DUE TO STAGE 4 CHRONIC KIDNEY DISEASE: ICD-10-CM

## 2025-05-29 DIAGNOSIS — I10 ESSENTIAL HYPERTENSION: ICD-10-CM

## 2025-05-29 DIAGNOSIS — N18.4 CKD (CHRONIC KIDNEY DISEASE) STAGE 4, GFR 15-29 ML/MIN (MULTI): ICD-10-CM

## 2025-05-29 DIAGNOSIS — N25.81 SECONDARY HYPERPARATHYROIDISM (MULTI): ICD-10-CM

## 2025-05-29 PROCEDURE — 3048F LDL-C <100 MG/DL: CPT | Performed by: INTERNAL MEDICINE

## 2025-05-29 PROCEDURE — 1036F TOBACCO NON-USER: CPT | Performed by: INTERNAL MEDICINE

## 2025-05-29 PROCEDURE — 99214 OFFICE O/P EST MOD 30 MIN: CPT | Performed by: INTERNAL MEDICINE

## 2025-05-29 PROCEDURE — 3008F BODY MASS INDEX DOCD: CPT | Performed by: INTERNAL MEDICINE

## 2025-05-29 PROCEDURE — 1159F MED LIST DOCD IN RCRD: CPT | Performed by: INTERNAL MEDICINE

## 2025-05-29 PROCEDURE — 3078F DIAST BP <80 MM HG: CPT | Performed by: INTERNAL MEDICINE

## 2025-05-29 PROCEDURE — 3074F SYST BP LT 130 MM HG: CPT | Performed by: INTERNAL MEDICINE

## 2025-05-29 RX ORDER — CALCITRIOL 0.5 UG/1
0.5 CAPSULE ORAL DAILY
Qty: 90 CAPSULE | Refills: 1 | Status: SHIPPED | OUTPATIENT
Start: 2025-05-29 | End: 2025-11-25

## 2025-05-29 ASSESSMENT — PATIENT HEALTH QUESTIONNAIRE - PHQ9
1. LITTLE INTEREST OR PLEASURE IN DOING THINGS: NOT AT ALL
2. FEELING DOWN, DEPRESSED OR HOPELESS: NOT AT ALL
SUM OF ALL RESPONSES TO PHQ9 QUESTIONS 1 & 2: 0

## 2025-05-29 NOTE — PROGRESS NOTES
Patient ID: Flip Murphy is a 69 y.o. male who is seen in nephrology clinic for worsening kidney disease over the past 4 years.  Has a hx of DM2, hypertension, AAA, hyperipidemia. There is fairly rapid worsening of kidney function over the past 5 years.  Most recently creatinine up to 4.3 mg/dL.  There is a mild acidemia and hypocalcemia but otherwise no major electrolyte abnormalities.   No urine studies since 2020 at which time there 1.8 g proteinuria     Imaging in 2025 with bilateral cortical echogenicity, no obstruction, stones.  cysts were seen bilaterally    Has been diabetic for approximately 10-15 years.  No evidence of retinopathy on recent exam.  Denies foot ulcers, no symptoms of neuropathy Last A1c 7.6    HTN for approximately 10 -15 years.  Home blood pressures are not known.      CLARA negative and rest of serologies are negative as well    Weight stable.  No lower ext edema.  No epistaxis, no rashes, bruises, joint pains,     No dysuria, no gross hematuria, 1-2 episodes nocturia, no flank discomfort    Review of systems negative unless noted in HPI      Patient Active Problem List   Diagnosis    AAA (abdominal aortic aneurysm)    Essential hypertension    Bruit of left carotid artery    Cataract, nuclear sclerotic, both eyes    Chronic cough    Chronic diarrhea    Chronic obstructive pulmonary disease (Multi)    Chronic right shoulder pain    CKD (chronic kidney disease)    Type 2 diabetes mellitus    Early dry stage nonexudative age-related macular degeneration of left eye    Elevated liver enzymes    Erectile dysfunction    GERD (gastroesophageal reflux disease)    Hyperlipidemia    Hyperopia of both eyes    Hypoglycemia    Carotid stenosis, bilateral    PVD (peripheral vascular disease)    Primary insomnia    Secondary pancreatic insufficiency (HHS-HCC)    Small bowel obstruction (Multi)    Stenosis of right carotid artery    Tobacco use disorder, moderate, in early remission    Pulmonary HTN  (Multi)    Generalized abdominal pressure    Claudication    Hypomagnesemia    Pyelonephritis    Aortoiliac occlusive disease (Multi)    Tricuspid valve disorder    Emphysema, unspecified    Acute on chronic pancreatitis (Multi)    Type 2 diabetes mellitus with other circulatory complication, with long-term current use of insulin    Chronic renal disease, stage IV (Multi)        No family history on file.    Social History     Tobacco Use    Smoking status: Former     Current packs/day: 0.00     Average packs/day: 1 pack/day for 46.0 years (46.0 ttl pk-yrs)     Types: Cigarettes     Start date: 3/11/1977     Quit date: 3/11/2023     Years since quittin.2     Passive exposure: Past    Smokeless tobacco: Never   Substance Use Topics    Alcohol use: Not Currently             Current Outpatient Medications:     amLODIPine (Norvasc) 10 mg tablet, Take 1 tablet (10 mg) by mouth once daily., Disp: 90 tablet, Rfl: 1    atorvastatin (Lipitor) 40 mg tablet, Take 1 tablet (40 mg) by mouth once daily., Disp: 90 tablet, Rfl: 1    Basaglar KwikPen U-100 Insulin 100 unit/mL (3 mL) pen, INJECT 20 UNITS UNDER THE SKIN ONCE DAILY AT BEDTIME. (Patient taking differently: 10 Units if needed.), Disp: 15 mL, Rfl: 2    blood sugar diagnostic (Contour Next Test Strips) strip, test 1-2 times daily, Disp: 200 strip, Rfl: 1    calcitriol (Rocaltrol) 0.25 mcg capsule, Take 1 capsule (0.25 mcg) by mouth once daily., Disp: 90 capsule, Rfl: 1    Creon 24,000-76,000 -120,000 unit capsule, Take by mouth., Disp: , Rfl:     folic acid (Folvite) 1 mg tablet, Take 1 tablet (1 mg) by mouth once daily., Disp: 30 tablet, Rfl: 6    hydrALAZINE (Apresoline) 10 mg tablet, TAKE ONE TABLET (10 mg) BY MOUTH EVERY 12 HOURS, Disp: 180 tablet, Rfl: 1    lancets misc, Use to test blood sugar 1-2 times daily, Disp: 200 each, Rfl: 1    lipase-protease-amylase (Creon) 24,000-76,000 -120,000 unit capsule, TAKE UP TO 3 CAPSULES BY MOUTH THREE TIMES A DAY WITH MEALS  "AND 2 CAPSULES WITH SNACKS., Disp: 1440 capsule, Rfl: 2    metoprolol succinate XL (Toprol-XL) 50 mg 24 hr tablet, Take 1 tablet (50 mg) by mouth once daily., Disp: 90 tablet, Rfl: 1    pen needle, diabetic (BD Ultra-Fine Mini Pen Needle) 31 gauge x 3/16\" needle, USE DAILY FOR INSULIN INJECTIONS, Disp: 100 each, Rfl: 1    semaglutide (OZEMPIC) 1 mg/dose (4 mg/3 mL) pen injector, Inject 1 mg under the skin 1 (one) time per week., Disp: 3 mL, Rfl: 0    sodium bicarbonate 650 mg tablet, Take 2 tablets (1,300 mg) by mouth 2 times a day., Disp: 360 tablet, Rfl: 3    tadalafil (Cialis) 10 mg tablet, Take 1 tablet (10 mg) by mouth once daily as needed for erectile dysfunction., Disp: 12 tablet, Rfl: 2    multivitamin with minerals tablet, Take 1 tablet by mouth once daily. (Patient not taking: Reported on 5/29/2025), Disp: , Rfl:        Vitals:    05/29/25 1035   BP: 111/65   Pulse: 81        Physical Exam  Gen alert, pleasant  Heart RRR, no murmurs or rubs  Lungs clear bilaterally  Abd soft, no bruits noted  Ext no lower extremity edema bilaterally, normal pulses  Skin no bruises or rashes on visible skin surfaces  Neuro no asterixis, no focal deficits  Psych normal affect      Lab Results   Component Value Date    WBC 9.9 05/08/2025    HGB 10.2 (L) 05/08/2025    HCT 31.7 (L) 05/08/2025    MCV 86 05/08/2025     05/08/2025      Lab Results   Component Value Date    GLUCOSE 124 (H) 05/08/2025    CALCIUM 7.3 (L) 05/08/2025     05/08/2025    K 4.5 05/08/2025    CO2 20 (L) 05/08/2025     (H) 05/08/2025    BUN 44 (H) 05/08/2025    CREATININE 4.29 (H) 05/08/2025      Lab Results   Component Value Date    MICROALBCREA 1,827.5 (H) 01/09/2020        Lab Results   Component Value Date    .1 (H) 01/23/2025                Assessment/Plan   CKD 4-  with proteinuria dating back to at least 2017 .  There is accompanying mild acidemia and mild hypocalcemia.      Suspect this is bad diabetic nephropathy.  Kidney " biopsy could be helpful diagnostically but suspect will not  at this time given chronicity and severity of kidney disease    With relatively rapid worsening of kidney function over the past few years, discussed prospects of RRT.  Pt did not seem in interested in pursuing RRT but was willing to attend dialysis/modality education session.    Will place consult to kidney care navigator    Htn - home bp's are not known but in office bp is at goal. No changes to medications at this time    Ckd-mbd - mild acidemia noted, continue oral sodium bicarbonate 1300 mg bid.  With ongoing hypocalcemia will increase calcitriol 0.5 mcg daily    Anemia - seen by hematology.  Likely a component of anemia of ckd.  No need for GAYATHRI at this time    RTC in 3 months

## 2025-05-30 ENCOUNTER — PHARMACY VISIT (OUTPATIENT)
Dept: PHARMACY | Facility: CLINIC | Age: 70
End: 2025-05-30
Payer: COMMERCIAL

## 2025-06-04 ENCOUNTER — APPOINTMENT (OUTPATIENT)
Dept: PHARMACY | Facility: HOSPITAL | Age: 70
End: 2025-06-04
Payer: MEDICARE

## 2025-06-04 DIAGNOSIS — E11.9 TYPE 2 DIABETES MELLITUS WITHOUT COMPLICATION, UNSPECIFIED WHETHER LONG TERM INSULIN USE: ICD-10-CM

## 2025-06-04 NOTE — PROGRESS NOTES
Clinical Pharmacy Appointment  Subjective     Patient ID: Flip Murphy is a 69 y.o. male who presents for Diabetes.    Referring Provider: Tim Hoffman,      DIABETES MELLITUS TYPE 2:    Does patient follow with Endocrinology: No  Last optometry exam: Appointment scheduled  Most recent visit in Podiatry: PCP     Diet: No change  Breakfast: pancakes  Lunch: Sandwiches and fries  Dinner: [employer], other take out type items, tv dinner, Rice, Soup    Exercise: Walking    No Known Allergies    Objective     Current DM Pharmacotherapy:     Ozempic 1 mg once weekly   Stopped basaglar    Clarifications to above regimen: noted above  Adverse events: not at this time    SECONDARY PREVENTION  - Statin? Yes, atorvastatin 40 mg daily  - ACEi/ARB? No, DC, now on amlodipine and hydralazine  - Aspirin? No    Current monitoring regimen:   Patient is using: glucometer    Testing frequency: FBG every morning and once nightly     SMBG Readings:   FB, 103, 135, 123, 120, 112, 202, 138  PPB, 120, 140, 139, 226, 166, 205    Any episodes of hypoglycemia? No.  Did patient treat episode of hypoglycemia appropriately? N/A    Pertinent PMH Review:  - PMH of Pancreatitis: Yes  - PMH/FH of Medullary Thyroid Cancer: No  - PMH/FH of Multiple Endocrine Neoplasia (MEN) Type II: No  - PMH of Retinopathy: Follows with ophth for cataract and macular degeneration  - PMH of Urinary Tract Infections/Yeast Infections: No    Lab Review  BMP  Lab Results   Component Value Date    CALCIUM 7.3 (L) 2025     2025    K 4.5 2025    CO2 20 (L) 2025     (H) 2025    BUN 44 (H) 2025    CREATININE 4.29 (H) 2025    EGFR 14 (L) 2025     LFTs  Lab Results   Component Value Date    ALT 43 2025    AST 26 2025    ALKPHOS 109 2025    BILITOT 0.3 2025     FLP  Lab Results   Component Value Date    TRIG 88 2025    CHOL 126 2025    LDLF 62 2023    LDLCALC 76  01/23/2025    HDL 32.2 01/23/2025       The ASCVD Risk score (Willy PHILLIPS, et al., 2019) failed to calculate for the following reasons:    The valid total cholesterol range is 130 to 320 mg/dL  Urine Microalbumin  Lab Results   Component Value Date    MICROALBCREA 1,827.5 (H) 01/09/2020     Albumin/Creatine Ratio   Date Value Ref Range Status   01/09/2020 1,827.5 (H) 0.0 - 30.0 ug/mg crt Final   11/27/2018 1,187.2 (H) 0.0 - 30.0 ug/mg crt Final   12/14/2017 667.7 (H) 0.0 - 30.0 ug/mg crt Final     Weight Management  Wt Readings from Last 3 Encounters:   05/29/25 66.2 kg (146 lb)   05/08/25 69.6 kg (153 lb 6.4 oz)   02/19/25 71.2 kg (157 lb)      There is no height or weight on file to calculate BMI.   A1C  Lab Results   Component Value Date    HGBA1C 7.6 (H) 02/19/2025    HGBA1C 7.1 (H) 08/21/2024    HGBA1C 7.9 (H) 02/28/2024         Assessment/Plan     Problem List Items Addressed This Visit       Type 2 diabetes mellitus     Patient's goal A1c is < 7.0%.  Is pt at goal? No, 7.6%  Patient's SMBGs are largely at goal.     Rationale for plan: Patient was experiencing frequent lows when using insulin. Since initiation and titration of ozempic has been able to control bg without use of insulin and threats of lows. Will need to continue to monitor patients appetite and weight. Will continue to trial 1 mg dose for remaining 3 weeks and then consider dose decrease or continuation.     Medication Changes:  CONTINUE  Ozempic 1 mg once weekly (Wednesdays)    Future Considerations:  Ozempic dose, insulin requirement (if needed)    Monitoring and Education:  Ozempic Education:     - Counseled patient on Ozempic MOA, expectations, side effects, duration of therapy, administration, and monitoring parameters.  - Provided detailed dosing and administration counseling to ensure proper technique.   - Reviewed Ozempic titration schedule, starting with 0.25 mg once weekly for 4 weeks, then continuing on 0.5 mg once weekly. Pt verbalized  understanding.  - Counseled patient on the benefits of GLP-1ra, such as cardiovascular risk reduction, glycemic control, and weight loss potential.  - Reviewed storage requirements of Ozempic when not in use, and when to administer the medication if a dose is missed.  - Advised patient that they may experience improved satiety after meals and portion sizes of meals may be reduced as doses of Ozempic increase.  - Counseled patient to avoid foods that are fatty/oily as this may precipitate the nausea/GI upset that may occur with new start Ozempic.     Monitor for signs and symptoms of pancreatitis:  Pain/tenderness in upper abdomen +/- radiating to back  Fever  Increase heart rate  Upset stomach/vomiting     Follow up: 3 weeks, virtually    Kalin Lopez, PharmD  Clinical Pharmacy Specialist, Primary Care     Continue all meds under the continuation of care with the referring provider and clinical pharmacy team

## 2025-06-17 DIAGNOSIS — Z79.4 TYPE 2 DIABETES MELLITUS WITHOUT COMPLICATION, WITH LONG-TERM CURRENT USE OF INSULIN: ICD-10-CM

## 2025-06-17 DIAGNOSIS — E11.9 TYPE 2 DIABETES MELLITUS WITHOUT COMPLICATION, WITH LONG-TERM CURRENT USE OF INSULIN: ICD-10-CM

## 2025-06-17 RX ORDER — BLOOD SUGAR DIAGNOSTIC
STRIP MISCELLANEOUS
Qty: 200 STRIP | Refills: 1 | Status: SHIPPED | OUTPATIENT
Start: 2025-06-17

## 2025-06-18 ENCOUNTER — SPECIALTY PHARMACY (OUTPATIENT)
Dept: PHARMACY | Facility: CLINIC | Age: 70
End: 2025-06-18

## 2025-06-19 ENCOUNTER — OFFICE VISIT (OUTPATIENT)
Dept: NEPHROLOGY | Facility: CLINIC | Age: 70
End: 2025-06-19
Payer: MEDICARE

## 2025-06-19 VITALS
WEIGHT: 146 LBS | DIASTOLIC BLOOD PRESSURE: 83 MMHG | BODY MASS INDEX: 21.62 KG/M2 | HEIGHT: 69 IN | HEART RATE: 81 BPM | SYSTOLIC BLOOD PRESSURE: 150 MMHG

## 2025-06-19 DIAGNOSIS — D63.1 ANEMIA DUE TO STAGE 4 CHRONIC KIDNEY DISEASE: ICD-10-CM

## 2025-06-19 DIAGNOSIS — I10 ESSENTIAL HYPERTENSION: Primary | ICD-10-CM

## 2025-06-19 DIAGNOSIS — N18.4 CHRONIC RENAL DISEASE, STAGE IV (MULTI): ICD-10-CM

## 2025-06-19 DIAGNOSIS — N18.4 ANEMIA DUE TO STAGE 4 CHRONIC KIDNEY DISEASE: ICD-10-CM

## 2025-06-19 PROCEDURE — 1160F RVW MEDS BY RX/DR IN RCRD: CPT | Performed by: NURSE PRACTITIONER

## 2025-06-19 PROCEDURE — 1126F AMNT PAIN NOTED NONE PRSNT: CPT | Performed by: NURSE PRACTITIONER

## 2025-06-19 PROCEDURE — 1159F MED LIST DOCD IN RCRD: CPT | Performed by: NURSE PRACTITIONER

## 2025-06-19 PROCEDURE — 3008F BODY MASS INDEX DOCD: CPT | Performed by: NURSE PRACTITIONER

## 2025-06-19 PROCEDURE — 99215 OFFICE O/P EST HI 40 MIN: CPT | Performed by: NURSE PRACTITIONER

## 2025-06-19 PROCEDURE — 3048F LDL-C <100 MG/DL: CPT | Performed by: NURSE PRACTITIONER

## 2025-06-19 PROCEDURE — 3077F SYST BP >= 140 MM HG: CPT | Performed by: NURSE PRACTITIONER

## 2025-06-19 PROCEDURE — 3079F DIAST BP 80-89 MM HG: CPT | Performed by: NURSE PRACTITIONER

## 2025-06-19 PROCEDURE — G2212 PROLONG OUTPT/OFFICE VIS: HCPCS | Performed by: NURSE PRACTITIONER

## 2025-06-19 RX ORDER — ACETAMINOPHEN 500 MG
1 TABLET ORAL ONCE
Qty: 1 EACH | Refills: 0 | Status: SHIPPED | OUTPATIENT
Start: 2025-06-19 | End: 2025-06-19

## 2025-06-19 ASSESSMENT — ENCOUNTER SYMPTOMS
SHORTNESS OF BREATH: 0
DIZZINESS: 0
VOMITING: 0
NAUSEA: 0
DYSURIA: 0
LIGHT-HEADEDNESS: 0

## 2025-06-19 ASSESSMENT — PATIENT HEALTH QUESTIONNAIRE - PHQ9
1. LITTLE INTEREST OR PLEASURE IN DOING THINGS: NOT AT ALL
2. FEELING DOWN, DEPRESSED OR HOPELESS: NOT AT ALL
SUM OF ALL RESPONSES TO PHQ9 QUESTIONS 1 AND 2: 0

## 2025-06-19 ASSESSMENT — PAIN SCALES - GENERAL: PAINLEVEL_OUTOF10: 0-NO PAIN

## 2025-06-23 PROBLEM — D63.1 ANEMIA DUE TO STAGE 4 CHRONIC KIDNEY DISEASE: Status: ACTIVE | Noted: 2024-08-21

## 2025-06-25 ENCOUNTER — APPOINTMENT (OUTPATIENT)
Dept: PHARMACY | Facility: HOSPITAL | Age: 70
End: 2025-06-25
Payer: MEDICARE

## 2025-06-25 DIAGNOSIS — E11.9 TYPE 2 DIABETES MELLITUS WITHOUT COMPLICATION, UNSPECIFIED WHETHER LONG TERM INSULIN USE: ICD-10-CM

## 2025-06-25 PROCEDURE — RXMED WILLOW AMBULATORY MEDICATION CHARGE

## 2025-06-25 NOTE — PROGRESS NOTES
Clinical Pharmacy Appointment  Subjective     Patient ID: Flip Murphy is a 69 y.o. male who presents for Diabetes.    Referring Provider: Tim Hoffman,      DIABETES MELLITUS TYPE 2:    Does patient follow with Endocrinology: No  Last optometry exam: Appointment scheduled  Most recent visit in Podiatry: PCP     Diet: No change, eating similar to before start.  Breakfast: pancakes  Lunch: Sandwiches and fries  Dinner: [employer]ipot[employer], other take out type items, tv dinner, Rice, Soup    Exercise: Walking    No Known Allergies    Objective     Current DM Pharmacotherapy:     Ozempic 1 mg once weekly     Clarifications to above regimen: noted above  Adverse events: not at this time    Previous meds - basaglar    SECONDARY PREVENTION  - Statin? Yes, atorvastatin 40 mg daily  - ACEi/ARB? No, DC, now on amlodipine and hydralazine  - Aspirin? No    Current monitoring regimen:   Patient is using: glucometer    Testing frequency: FBG every morning and once nightly     SMBG Readings:   FBs  PPBG: 160s    Any episodes of hypoglycemia? No.  Did patient treat episode of hypoglycemia appropriately? N/A    Pertinent PMH Review:  - PMH of Pancreatitis: Yes  - PMH/FH of Medullary Thyroid Cancer: No  - PMH/FH of Multiple Endocrine Neoplasia (MEN) Type II: No  - PMH of Retinopathy: Follows with ophth for cataract and macular degeneration  - PMH of Urinary Tract Infections/Yeast Infections: No    Lab Review  BMP  Lab Results   Component Value Date    CALCIUM 7.3 (L) 2025     2025    K 4.5 2025    CO2 20 (L) 2025     (H) 2025    BUN 44 (H) 2025    CREATININE 4.29 (H) 2025    EGFR 14 (L) 2025     LFTs  Lab Results   Component Value Date    ALT 43 2025    AST 26 2025    ALKPHOS 109 2025    BILITOT 0.3 2025     FLP  Lab Results   Component Value Date    TRIG 88 2025    CHOL 126 2025    LDLF 62 2023    LDLCALC 76 2025    HDL  32.2 01/23/2025       The ASCVD Risk score (Willy PHILLIPS, et al., 2019) failed to calculate for the following reasons:    The valid total cholesterol range is 130 to 320 mg/dL  Urine Microalbumin  Lab Results   Component Value Date    MICROALBCREA 1,827.5 (H) 01/09/2020     Albumin/Creatine Ratio   Date Value Ref Range Status   01/09/2020 1,827.5 (H) 0.0 - 30.0 ug/mg crt Final   11/27/2018 1,187.2 (H) 0.0 - 30.0 ug/mg crt Final   12/14/2017 667.7 (H) 0.0 - 30.0 ug/mg crt Final     Weight Management  Wt Readings from Last 3 Encounters:   06/19/25 66.2 kg (146 lb)   05/29/25 66.2 kg (146 lb)   05/08/25 69.6 kg (153 lb 6.4 oz)      There is no height or weight on file to calculate BMI.   A1C  Lab Results   Component Value Date    HGBA1C 7.6 (H) 02/19/2025    HGBA1C 7.1 (H) 08/21/2024    HGBA1C 7.9 (H) 02/28/2024         Assessment/Plan     Problem List Items Addressed This Visit       Type 2 diabetes mellitus     Patient's goal A1c is < 7.0%.  Is pt at goal? No, 7.6%  Patient's SMBGs are largely at goal.     Rationale for plan: Patient was experiencing frequent lows when using insulin. Since initiation and titration of ozempic has been able to control bg without use of insulin and threats of lows. Will need to continue to monitor patients appetite and weight.     Medication Changes:  CONTINUE  Ozempic 1 mg once weekly (Wednesdays)    Future Considerations:  Ozempic dose, insulin requirement (if needed)    Monitoring and Education:  Ozempic Education:     - Counseled patient on Ozempic MOA, expectations, side effects, duration of therapy, administration, and monitoring parameters.  - Provided detailed dosing and administration counseling to ensure proper technique.   - Reviewed Ozempic titration schedule, starting with 0.25 mg once weekly for 4 weeks, then continuing on 0.5 mg once weekly. Pt verbalized understanding.  - Counseled patient on the benefits of GLP-1ra, such as cardiovascular risk reduction, glycemic control,  and weight loss potential.  - Reviewed storage requirements of Ozempic when not in use, and when to administer the medication if a dose is missed.  - Advised patient that they may experience improved satiety after meals and portion sizes of meals may be reduced as doses of Ozempic increase.  - Counseled patient to avoid foods that are fatty/oily as this may precipitate the nausea/GI upset that may occur with new start Ozempic.     Monitor for signs and symptoms of pancreatitis:  Pain/tenderness in upper abdomen +/- radiating to back  Fever  Increase heart rate  Upset stomach/vomiting     Follow up: 2 weeks, virtually    Kalin Lopez, PharmD  Clinical Pharmacy Specialist, Primary Care     Continue all meds under the continuation of care with the referring provider and clinical pharmacy team

## 2025-06-28 ENCOUNTER — PHARMACY VISIT (OUTPATIENT)
Dept: PHARMACY | Facility: CLINIC | Age: 70
End: 2025-06-28
Payer: COMMERCIAL

## 2025-07-09 ENCOUNTER — APPOINTMENT (OUTPATIENT)
Dept: OPHTHALMOLOGY | Facility: CLINIC | Age: 70
End: 2025-07-09
Payer: MEDICARE

## 2025-07-09 ENCOUNTER — TELEMEDICINE (OUTPATIENT)
Dept: PHARMACY | Facility: HOSPITAL | Age: 70
End: 2025-07-09
Payer: MEDICARE

## 2025-07-09 DIAGNOSIS — E11.9 TYPE 2 DIABETES MELLITUS WITHOUT COMPLICATION, UNSPECIFIED WHETHER LONG TERM INSULIN USE: ICD-10-CM

## 2025-07-09 NOTE — PROGRESS NOTES
Clinical Pharmacy Appointment  Subjective     Patient ID: Flip Murphy is a 69 y.o. male who presents for Diabetes.    Referring Provider: Tim Hoffman, DO   Next appointment: 2025    DIABETES MELLITUS TYPE 2:    Does patient follow with Endocrinology: No  Last optometry exam: Appointment scheduled  Most recent visit in Podiatry: PCP     Diet: Feels like not eating as much, maybe more fruit  Breakfast: pancakes  Lunch: Sandwiches and fries  Dinner: [employer], other take out type items, tv dinner, Rice, Soup    Exercise: Walking    No Known Allergies    Objective     Current DM Pharmacotherapy:     Ozempic 1 mg once weekly   Uses basaglar if BG elevated >180    Clarifications to above regimen: noted above  Adverse events: not at this time    Previous meds - basaglar, sitagliptin    SECONDARY PREVENTION  - Statin? Yes, atorvastatin 40 mg daily  - ACEi/ARB? No, DC, now on amlodipine and hydralazine  - Aspirin? No    Current monitoring regimen:   Patient is using: glucometer    Testing frequency: FBG every morning and once nightly     SMBG Readings:   FBs-150s  PPBs to 300    Any episodes of hypoglycemia? No.  Did patient treat episode of hypoglycemia appropriately? N/A    Pertinent PMH Review:  - PMH of Pancreatitis: Yes  - PMH/FH of Medullary Thyroid Cancer: No  - PMH/FH of Multiple Endocrine Neoplasia (MEN) Type II: No  - PMH of Retinopathy: Follows with ophth for cataract and macular degeneration  - PMH of Urinary Tract Infections/Yeast Infections: No    Lab Review  BMP  Lab Results   Component Value Date    CALCIUM 7.3 (L) 2025     2025    K 4.5 2025    CO2 20 (L) 2025     (H) 2025    BUN 44 (H) 2025    CREATININE 4.29 (H) 2025    EGFR 14 (L) 2025     LFTs  Lab Results   Component Value Date    ALT 43 2025    AST 26 2025    ALKPHOS 109 2025    BILITOT 0.3 2025     FLP  Lab Results   Component Value Date     TRIG 88 01/23/2025    CHOL 126 01/23/2025    LDLF 62 08/17/2023    LDLCALC 76 01/23/2025    HDL 32.2 01/23/2025       The ASCVD Risk score (Willy PHILLIPS, et al., 2019) failed to calculate for the following reasons:    The valid total cholesterol range is 130 to 320 mg/dL  Urine Microalbumin  Lab Results   Component Value Date    MICROALBCREA 1,827.5 (H) 01/09/2020     Albumin/Creatine Ratio   Date Value Ref Range Status   01/09/2020 1,827.5 (H) 0.0 - 30.0 ug/mg crt Final   11/27/2018 1,187.2 (H) 0.0 - 30.0 ug/mg crt Final   12/14/2017 667.7 (H) 0.0 - 30.0 ug/mg crt Final     Weight Management  Wt Readings from Last 3 Encounters:   06/19/25 66.2 kg (146 lb)   05/29/25 66.2 kg (146 lb)   05/08/25 69.6 kg (153 lb 6.4 oz)      There is no height or weight on file to calculate BMI.   A1C  Lab Results   Component Value Date    HGBA1C 7.6 (H) 02/19/2025    HGBA1C 7.1 (H) 08/21/2024    HGBA1C 7.9 (H) 02/28/2024         Assessment/Plan     Problem List Items Addressed This Visit       Type 2 diabetes mellitus     Patient's goal A1c is < 7.0%.  Is pt at goal? No, 7.6%  Patient's SMBGs are very sporadic during the last two weeks.  Rationale for plan: Patient was experiencing frequent lows when using insulin. Most recently patients blood glucose went from trending to goal to very sporadic on ozempic monotherapy. He has basaglar pen that he used 6 units of if BG consistently >180. Will continue current therapy and follow up in 2 weeks to see if SMBGs return to goal post holidays.     Medication Changes:  CONTINUE  Ozempic 1 mg once weekly (Wednesdays)    Future Considerations:  Ozempic dose, insulin requirement (if needed), new basaglar pen?    Monitoring and Education:  Patient believes he's lost about 10 lbs since starting ozempic a few months ago and will need to continue to monitor to ensure patient stays at healthy weight.  Ozempic Education:     - Counseled patient on Ozempic MOA, expectations, side effects, duration of  therapy, administration, and monitoring parameters.  - Provided detailed dosing and administration counseling to ensure proper technique.   - Reviewed Ozempic titration schedule, starting with 0.25 mg once weekly for 4 weeks, then continuing on 0.5 mg once weekly. Pt verbalized understanding.  - Counseled patient on the benefits of GLP-1ra, such as cardiovascular risk reduction, glycemic control, and weight loss potential.  - Reviewed storage requirements of Ozempic when not in use, and when to administer the medication if a dose is missed.  - Advised patient that they may experience improved satiety after meals and portion sizes of meals may be reduced as doses of Ozempic increase.  - Counseled patient to avoid foods that are fatty/oily as this may precipitate the nausea/GI upset that may occur with new start Ozempic.     Monitor for signs and symptoms of pancreatitis:  Pain/tenderness in upper abdomen +/- radiating to back  Fever  Increase heart rate  Upset stomach/vomiting     Follow up: 2 weeks, virtually    Kalin Lopez PharmD  Clinical Pharmacy Specialist, Primary Care     Continue all meds under the continuation of care with the referring provider and clinical pharmacy team

## 2025-07-15 ENCOUNTER — SPECIALTY PHARMACY (OUTPATIENT)
Dept: PHARMACY | Facility: CLINIC | Age: 70
End: 2025-07-15

## 2025-07-15 PROCEDURE — RXMED WILLOW AMBULATORY MEDICATION CHARGE

## 2025-07-18 ENCOUNTER — PHARMACY VISIT (OUTPATIENT)
Dept: PHARMACY | Facility: CLINIC | Age: 70
End: 2025-07-18
Payer: COMMERCIAL

## 2025-07-23 ENCOUNTER — APPOINTMENT (OUTPATIENT)
Dept: PHARMACY | Facility: HOSPITAL | Age: 70
End: 2025-07-23
Payer: MEDICARE

## 2025-07-23 DIAGNOSIS — E11.9 TYPE 2 DIABETES MELLITUS WITHOUT COMPLICATION, UNSPECIFIED WHETHER LONG TERM INSULIN USE: ICD-10-CM

## 2025-07-23 PROCEDURE — RXMED WILLOW AMBULATORY MEDICATION CHARGE

## 2025-07-23 NOTE — PROGRESS NOTES
Clinical Pharmacy Appointment  Subjective     Patient ID: Flip Murphy is a 69 y.o. male who presents for Diabetes.    Referring Provider: Tim Hoffman, DO   Next appointment: 2025    DIABETES MELLITUS TYPE 2:    Does patient follow with Endocrinology: No  Last optometry exam: Appointment scheduled  Most recent visit in Podiatry: PCP     Diet: Eating more than was - feeling better  Breakfast: pancakes  Lunch: Sandwiches and fries  Dinner: [employer], other take out type items, tv dinner, Rice, Soup    Exercise: Walking    No Known Allergies    Objective     Current DM Pharmacotherapy:     Ozempic 1 mg once weekly   Uses basaglar if BG elevated >180 - has not needed    Clarifications to above regimen: noted above  Adverse events: not at this time    Previous meds - basaglar, sitagliptin    SECONDARY PREVENTION  - Statin? Yes, atorvastatin 40 mg daily  - ACEi/ARB? No, DC, now on amlodipine and hydralazine  - Aspirin? No    Current monitoring regimen:   Patient is using: glucometer    Testing frequency: FBG every morning and once nightly     SMBG Readings:   FB-130s  PPB-170s    Any episodes of hypoglycemia? No.  Did patient treat episode of hypoglycemia appropriately? N/A    Pertinent PMH Review:  - PMH of Pancreatitis: Yes  - PMH/FH of Medullary Thyroid Cancer: No  - PMH/FH of Multiple Endocrine Neoplasia (MEN) Type II: No  - PMH of Retinopathy: Follows with ophth for cataract and macular degeneration  - PMH of Urinary Tract Infections/Yeast Infections: No    Lab Review  BMP  Lab Results   Component Value Date    CALCIUM 7.3 (L) 2025     2025    K 4.5 2025    CO2 20 (L) 2025     (H) 2025    BUN 44 (H) 2025    CREATININE 4.29 (H) 2025    EGFR 14 (L) 2025     LFTs  Lab Results   Component Value Date    ALT 43 2025    AST 26 2025    ALKPHOS 109 2025    BILITOT 0.3 2025     FLP  Lab Results   Component Value Date     TRIG 88 01/23/2025    CHOL 126 01/23/2025    LDLF 62 08/17/2023    LDLCALC 76 01/23/2025    HDL 32.2 01/23/2025       The ASCVD Risk score (Willy PHILLIPS, et al., 2019) failed to calculate for the following reasons:    The valid total cholesterol range is 130 to 320 mg/dL  Urine Microalbumin  Lab Results   Component Value Date    MICROALBCREA 1,827.5 (H) 01/09/2020     Albumin/Creatine Ratio   Date Value Ref Range Status   01/09/2020 1,827.5 (H) 0.0 - 30.0 ug/mg crt Final   11/27/2018 1,187.2 (H) 0.0 - 30.0 ug/mg crt Final   12/14/2017 667.7 (H) 0.0 - 30.0 ug/mg crt Final     Weight Management  Wt Readings from Last 3 Encounters:   06/19/25 66.2 kg (146 lb)   05/29/25 66.2 kg (146 lb)   05/08/25 69.6 kg (153 lb 6.4 oz)      There is no height or weight on file to calculate BMI.   A1C  Lab Results   Component Value Date    HGBA1C 7.6 (H) 02/19/2025    HGBA1C 7.1 (H) 08/21/2024    HGBA1C 7.9 (H) 02/28/2024         Assessment/Plan     Problem List Items Addressed This Visit    None      Patient's goal A1c is < 7.0%.  Is pt at goal? No, 7.6%  Patient's SMBGs are reportedly more controlled but forgot BG monitor at home while one trip.   Rationale for plan: Patient has become more consistent on current dose regimen again and will continue moving forward. He is eating more than we was but will continue to monitor weight. Monotherapy continues to be best option as minimal insulin causes low BG.    Medication Changes:  CONTINUE  Ozempic 1 mg once weekly (Wednesdays)    Future Considerations:  Waiting on lab work till follow up with PCP    Monitoring and Education:  Patient believes he's lost about 10 lbs since starting ozempic a few months ago and will need to continue to monitor to ensure patient stays at healthy weight.  Ozempic Education:     - Counseled patient on Ozempic MOA, expectations, side effects, duration of therapy, administration, and monitoring parameters.  - Provided detailed dosing and administration  counseling to ensure proper technique.   - Reviewed Ozempic titration schedule, starting with 0.25 mg once weekly for 4 weeks, then continuing on 0.5 mg once weekly. Pt verbalized understanding.  - Counseled patient on the benefits of GLP-1ra, such as cardiovascular risk reduction, glycemic control, and weight loss potential.  - Reviewed storage requirements of Ozempic when not in use, and when to administer the medication if a dose is missed.  - Advised patient that they may experience improved satiety after meals and portion sizes of meals may be reduced as doses of Ozempic increase.  - Counseled patient to avoid foods that are fatty/oily as this may precipitate the nausea/GI upset that may occur with new start Ozempic.     Monitor for signs and symptoms of pancreatitis:  Pain/tenderness in upper abdomen +/- radiating to back  Fever  Increase heart rate  Upset stomach/vomiting     Follow up: 4 weeks, virtually    Kalin Lopez, PharmD  Clinical Pharmacy Specialist, Primary Care     Continue all meds under the continuation of care with the referring provider and clinical pharmacy team

## 2025-07-28 ENCOUNTER — PHARMACY VISIT (OUTPATIENT)
Dept: PHARMACY | Facility: CLINIC | Age: 70
End: 2025-07-28
Payer: COMMERCIAL

## 2025-08-12 ENCOUNTER — SPECIALTY PHARMACY (OUTPATIENT)
Dept: PHARMACY | Facility: CLINIC | Age: 70
End: 2025-08-12

## 2025-08-12 PROCEDURE — RXMED WILLOW AMBULATORY MEDICATION CHARGE

## 2025-08-15 ENCOUNTER — PHARMACY VISIT (OUTPATIENT)
Dept: PHARMACY | Facility: CLINIC | Age: 70
End: 2025-08-15
Payer: COMMERCIAL

## 2025-08-15 DIAGNOSIS — I10 HYPERTENSION, UNSPECIFIED TYPE: ICD-10-CM

## 2025-08-15 DIAGNOSIS — E78.5 HYPERLIPIDEMIA, UNSPECIFIED HYPERLIPIDEMIA TYPE: ICD-10-CM

## 2025-08-20 ENCOUNTER — HOSPITAL ENCOUNTER (OUTPATIENT)
Dept: RADIOLOGY | Facility: CLINIC | Age: 70
Discharge: HOME | End: 2025-08-20
Payer: MEDICARE

## 2025-08-20 ENCOUNTER — APPOINTMENT (OUTPATIENT)
Dept: PRIMARY CARE | Facility: CLINIC | Age: 70
End: 2025-08-20
Payer: MEDICARE

## 2025-08-20 VITALS
DIASTOLIC BLOOD PRESSURE: 76 MMHG | WEIGHT: 138 LBS | BODY MASS INDEX: 20.44 KG/M2 | HEIGHT: 69 IN | SYSTOLIC BLOOD PRESSURE: 126 MMHG

## 2025-08-20 DIAGNOSIS — K21.9 GASTROESOPHAGEAL REFLUX DISEASE, UNSPECIFIED WHETHER ESOPHAGITIS PRESENT: ICD-10-CM

## 2025-08-20 DIAGNOSIS — K59.00 CONSTIPATION, UNSPECIFIED CONSTIPATION TYPE: ICD-10-CM

## 2025-08-20 DIAGNOSIS — E11.9 TYPE 2 DIABETES MELLITUS WITHOUT COMPLICATION, UNSPECIFIED WHETHER LONG TERM INSULIN USE: Primary | ICD-10-CM

## 2025-08-20 DIAGNOSIS — M79.671 PAIN IN BOTH FEET: ICD-10-CM

## 2025-08-20 DIAGNOSIS — M79.672 PAIN IN BOTH FEET: ICD-10-CM

## 2025-08-20 DIAGNOSIS — G47.00 INSOMNIA, UNSPECIFIED TYPE: ICD-10-CM

## 2025-08-20 PROCEDURE — 73630 X-RAY EXAM OF FOOT: CPT | Mod: BILATERAL PROCEDURE | Performed by: RADIOLOGY

## 2025-08-20 PROCEDURE — 3074F SYST BP LT 130 MM HG: CPT | Performed by: INTERNAL MEDICINE

## 2025-08-20 PROCEDURE — 73630 X-RAY EXAM OF FOOT: CPT | Mod: 50

## 2025-08-20 PROCEDURE — 1159F MED LIST DOCD IN RCRD: CPT | Performed by: INTERNAL MEDICINE

## 2025-08-20 PROCEDURE — G2211 COMPLEX E/M VISIT ADD ON: HCPCS | Performed by: INTERNAL MEDICINE

## 2025-08-20 PROCEDURE — 1158F ADVNC CARE PLAN TLK DOCD: CPT | Performed by: INTERNAL MEDICINE

## 2025-08-20 PROCEDURE — 3078F DIAST BP <80 MM HG: CPT | Performed by: INTERNAL MEDICINE

## 2025-08-20 PROCEDURE — 3008F BODY MASS INDEX DOCD: CPT | Performed by: INTERNAL MEDICINE

## 2025-08-20 PROCEDURE — 1123F ACP DISCUSS/DSCN MKR DOCD: CPT | Performed by: INTERNAL MEDICINE

## 2025-08-20 PROCEDURE — 1160F RVW MEDS BY RX/DR IN RCRD: CPT | Performed by: INTERNAL MEDICINE

## 2025-08-20 PROCEDURE — 99215 OFFICE O/P EST HI 40 MIN: CPT | Performed by: INTERNAL MEDICINE

## 2025-08-20 RX ORDER — PANTOPRAZOLE SODIUM 40 MG/1
40 TABLET, DELAYED RELEASE ORAL DAILY
Qty: 90 TABLET | Refills: 1 | Status: SHIPPED | OUTPATIENT
Start: 2025-08-20 | End: 2025-11-18

## 2025-08-20 RX ORDER — ATORVASTATIN CALCIUM 40 MG/1
40 TABLET, FILM COATED ORAL DAILY
Qty: 90 TABLET | Refills: 1 | Status: SHIPPED | OUTPATIENT
Start: 2025-08-20

## 2025-08-20 RX ORDER — PSYLLIUM HUSK 3.4 G/5.8G
1 POWDER ORAL DAILY
Qty: 660 G | Refills: 2 | Status: SHIPPED | OUTPATIENT
Start: 2025-08-20

## 2025-08-20 RX ORDER — TRAZODONE HYDROCHLORIDE 50 MG/1
50 TABLET ORAL NIGHTLY PRN
Qty: 90 TABLET | Refills: 1 | Status: SHIPPED | OUTPATIENT
Start: 2025-08-20 | End: 2025-11-18

## 2025-08-20 RX ORDER — METOPROLOL SUCCINATE 50 MG/1
50 TABLET, EXTENDED RELEASE ORAL DAILY
Qty: 90 TABLET | Refills: 1 | Status: SHIPPED | OUTPATIENT
Start: 2025-08-20

## 2025-08-21 LAB
EST. AVERAGE GLUCOSE BLD GHB EST-MCNC: 209 MG/DL
EST. AVERAGE GLUCOSE BLD GHB EST-SCNC: 11.6 MMOL/L
HBA1C MFR BLD: 8.9 %

## 2025-08-27 ENCOUNTER — APPOINTMENT (OUTPATIENT)
Dept: PHARMACY | Facility: HOSPITAL | Age: 70
End: 2025-08-27
Payer: MEDICARE

## 2025-08-27 DIAGNOSIS — E11.9 TYPE 2 DIABETES MELLITUS WITHOUT COMPLICATION, UNSPECIFIED WHETHER LONG TERM INSULIN USE: ICD-10-CM

## 2025-08-27 PROCEDURE — RXMED WILLOW AMBULATORY MEDICATION CHARGE

## 2025-08-27 RX ORDER — INSULIN DEGLUDEC 100 U/ML
4 INJECTION, SOLUTION SUBCUTANEOUS NIGHTLY
Qty: 15 ML | Refills: 3 | Status: SHIPPED | OUTPATIENT
Start: 2025-08-27

## 2025-08-28 ENCOUNTER — PHARMACY VISIT (OUTPATIENT)
Dept: PHARMACY | Facility: CLINIC | Age: 70
End: 2025-08-28
Payer: COMMERCIAL

## 2025-08-29 ENCOUNTER — RESULTS FOLLOW-UP (OUTPATIENT)
Dept: PRIMARY CARE | Facility: CLINIC | Age: 70
End: 2025-08-29
Payer: MEDICARE

## 2025-08-31 PROCEDURE — RXMED WILLOW AMBULATORY MEDICATION CHARGE

## 2025-09-03 ENCOUNTER — PHARMACY VISIT (OUTPATIENT)
Dept: PHARMACY | Facility: CLINIC | Age: 70
End: 2025-09-03
Payer: COMMERCIAL

## 2025-09-03 ENCOUNTER — APPOINTMENT (OUTPATIENT)
Dept: PHARMACY | Facility: HOSPITAL | Age: 70
End: 2025-09-03
Payer: MEDICARE

## 2025-09-04 ENCOUNTER — APPOINTMENT (OUTPATIENT)
Dept: NEPHROLOGY | Facility: CLINIC | Age: 70
End: 2025-09-04
Payer: MEDICARE

## 2025-09-04 DIAGNOSIS — N18.5 CHRONIC KIDNEY DISEASE, STAGE 5 (MULTI): Primary | ICD-10-CM

## 2025-09-04 DIAGNOSIS — Z01.818 PREOPERATIVE TESTING: ICD-10-CM

## 2025-09-04 DIAGNOSIS — I73.9 PERIPHERAL VASCULAR DISEASE, UNSPECIFIED: ICD-10-CM

## 2025-09-04 ASSESSMENT — PATIENT HEALTH QUESTIONNAIRE - PHQ9
SUM OF ALL RESPONSES TO PHQ9 QUESTIONS 1 & 2: 0
2. FEELING DOWN, DEPRESSED OR HOPELESS: NOT AT ALL
1. LITTLE INTEREST OR PLEASURE IN DOING THINGS: NOT AT ALL

## 2025-09-04 ASSESSMENT — PAIN SCALES - GENERAL: PAINLEVEL_OUTOF10: 0-NO PAIN

## 2025-09-05 DIAGNOSIS — N18.5 STAGE 5 CHRONIC KIDNEY DISEASE NOT ON CHRONIC DIALYSIS (MULTI): ICD-10-CM

## 2025-09-10 ENCOUNTER — APPOINTMENT (OUTPATIENT)
Dept: PHARMACY | Facility: HOSPITAL | Age: 70
End: 2025-09-10
Payer: MEDICARE

## 2025-09-24 ENCOUNTER — APPOINTMENT (OUTPATIENT)
Dept: OPHTHALMOLOGY | Facility: CLINIC | Age: 70
End: 2025-09-24
Payer: MEDICARE

## 2025-11-19 ENCOUNTER — APPOINTMENT (OUTPATIENT)
Dept: PRIMARY CARE | Facility: CLINIC | Age: 70
End: 2025-11-19
Payer: MEDICARE

## 2025-12-11 ENCOUNTER — APPOINTMENT (OUTPATIENT)
Dept: NEPHROLOGY | Facility: CLINIC | Age: 70
End: 2025-12-11
Payer: MEDICARE